# Patient Record
Sex: FEMALE | Race: WHITE | NOT HISPANIC OR LATINO | URBAN - METROPOLITAN AREA
[De-identification: names, ages, dates, MRNs, and addresses within clinical notes are randomized per-mention and may not be internally consistent; named-entity substitution may affect disease eponyms.]

---

## 2017-02-15 ENCOUNTER — COMMUNICATION - HEALTHEAST (OUTPATIENT)
Dept: FAMILY MEDICINE | Facility: CLINIC | Age: 37
End: 2017-02-15

## 2017-02-15 DIAGNOSIS — Z30.9 CONTRACEPTION MANAGEMENT: ICD-10-CM

## 2017-05-16 ENCOUNTER — RECORDS - HEALTHEAST (OUTPATIENT)
Dept: ADMINISTRATIVE | Facility: OTHER | Age: 37
End: 2017-05-16

## 2017-07-18 ENCOUNTER — COMMUNICATION - HEALTHEAST (OUTPATIENT)
Dept: FAMILY MEDICINE | Facility: CLINIC | Age: 37
End: 2017-07-18

## 2017-07-18 DIAGNOSIS — C44.91 BASAL CELL CARCINOMA: ICD-10-CM

## 2017-07-19 ENCOUNTER — COMMUNICATION - HEALTHEAST (OUTPATIENT)
Dept: FAMILY MEDICINE | Facility: CLINIC | Age: 37
End: 2017-07-19

## 2018-01-06 ENCOUNTER — COMMUNICATION - HEALTHEAST (OUTPATIENT)
Dept: FAMILY MEDICINE | Facility: CLINIC | Age: 38
End: 2018-01-06

## 2018-01-09 ENCOUNTER — COMMUNICATION - HEALTHEAST (OUTPATIENT)
Dept: FAMILY MEDICINE | Facility: CLINIC | Age: 38
End: 2018-01-09

## 2018-01-17 ENCOUNTER — OFFICE VISIT - HEALTHEAST (OUTPATIENT)
Dept: FAMILY MEDICINE | Facility: CLINIC | Age: 38
End: 2018-01-17

## 2018-01-17 DIAGNOSIS — Z13.29 SCREENING FOR ENDOCRINE, NUTRITIONAL, METABOLIC AND IMMUNITY DISORDER: ICD-10-CM

## 2018-01-17 DIAGNOSIS — Z13.21 SCREENING FOR ENDOCRINE, NUTRITIONAL, METABOLIC AND IMMUNITY DISORDER: ICD-10-CM

## 2018-01-17 DIAGNOSIS — Z13.0 SCREENING FOR ENDOCRINE, NUTRITIONAL, METABOLIC AND IMMUNITY DISORDER: ICD-10-CM

## 2018-01-17 DIAGNOSIS — Z00.00 VISIT FOR PREVENTIVE HEALTH EXAMINATION: ICD-10-CM

## 2018-01-17 DIAGNOSIS — E66.3 OVERWEIGHT: ICD-10-CM

## 2018-01-17 DIAGNOSIS — Z12.4 PAP SMEAR FOR CERVICAL CANCER SCREENING: ICD-10-CM

## 2018-01-17 DIAGNOSIS — Z13.228 SCREENING FOR ENDOCRINE, NUTRITIONAL, METABOLIC AND IMMUNITY DISORDER: ICD-10-CM

## 2018-01-17 LAB
ALBUMIN SERPL-MCNC: 3.6 G/DL (ref 3.5–5)
ALP SERPL-CCNC: 50 U/L (ref 45–120)
ALT SERPL W P-5'-P-CCNC: 16 U/L (ref 0–45)
ANION GAP SERPL CALCULATED.3IONS-SCNC: 9 MMOL/L (ref 5–18)
AST SERPL W P-5'-P-CCNC: 16 U/L (ref 0–40)
BILIRUB SERPL-MCNC: 0.5 MG/DL (ref 0–1)
BUN SERPL-MCNC: 13 MG/DL (ref 8–22)
CALCIUM SERPL-MCNC: 9.5 MG/DL (ref 8.5–10.5)
CHLORIDE BLD-SCNC: 105 MMOL/L (ref 98–107)
CHOLEST SERPL-MCNC: 179 MG/DL
CO2 SERPL-SCNC: 25 MMOL/L (ref 22–31)
CREAT SERPL-MCNC: 0.75 MG/DL (ref 0.6–1.1)
FASTING STATUS PATIENT QL REPORTED: YES
GFR SERPL CREATININE-BSD FRML MDRD: >60 ML/MIN/1.73M2
GLUCOSE BLD-MCNC: 98 MG/DL (ref 70–125)
HDLC SERPL-MCNC: 60 MG/DL
LDLC SERPL CALC-MCNC: 96 MG/DL
POTASSIUM BLD-SCNC: 4.3 MMOL/L (ref 3.5–5)
PROT SERPL-MCNC: 7.2 G/DL (ref 6–8)
SODIUM SERPL-SCNC: 139 MMOL/L (ref 136–145)
TRIGL SERPL-MCNC: 116 MG/DL
TSH SERPL DL<=0.005 MIU/L-ACNC: 1.64 UIU/ML (ref 0.3–5)

## 2018-01-17 ASSESSMENT — MIFFLIN-ST. JEOR: SCORE: 1383.49

## 2018-01-18 LAB
C TRACH DNA SPEC QL PROBE+SIG AMP: NEGATIVE
HBA1C MFR BLD: 5.2 % (ref 4.2–6.1)
HPV SOURCE: NORMAL
HUMAN PAPILLOMA VIRUS 16 DNA: NEGATIVE
HUMAN PAPILLOMA VIRUS 18 DNA: NEGATIVE
HUMAN PAPILLOMA VIRUS FINAL DIAGNOSIS: NORMAL
HUMAN PAPILLOMA VIRUS OTHER HR: NEGATIVE
N GONORRHOEA DNA SPEC QL NAA+PROBE: NEGATIVE
SPECIMEN DESCRIPTION: NORMAL

## 2018-01-23 LAB

## 2018-03-02 ENCOUNTER — RECORDS - HEALTHEAST (OUTPATIENT)
Dept: ADMINISTRATIVE | Facility: OTHER | Age: 38
End: 2018-03-02

## 2018-04-01 ENCOUNTER — COMMUNICATION - HEALTHEAST (OUTPATIENT)
Dept: FAMILY MEDICINE | Facility: CLINIC | Age: 38
End: 2018-04-01

## 2018-04-01 DIAGNOSIS — Z30.9 CONTRACEPTION MANAGEMENT: ICD-10-CM

## 2018-09-24 ENCOUNTER — OFFICE VISIT - HEALTHEAST (OUTPATIENT)
Dept: FAMILY MEDICINE | Facility: CLINIC | Age: 38
End: 2018-09-24

## 2018-09-24 DIAGNOSIS — M70.61 TROCHANTERIC BURSITIS, RIGHT HIP: ICD-10-CM

## 2018-09-24 ASSESSMENT — MIFFLIN-ST. JEOR: SCORE: 1388.03

## 2018-10-10 ENCOUNTER — COMMUNICATION - HEALTHEAST (OUTPATIENT)
Dept: FAMILY MEDICINE | Facility: CLINIC | Age: 38
End: 2018-10-10

## 2018-11-01 ENCOUNTER — OFFICE VISIT - HEALTHEAST (OUTPATIENT)
Dept: PHYSICAL THERAPY | Facility: REHABILITATION | Age: 38
End: 2018-11-01

## 2018-11-01 DIAGNOSIS — R29.898 WEAKNESS OF RIGHT HIP: ICD-10-CM

## 2018-11-01 DIAGNOSIS — M25.551 RIGHT HIP PAIN: ICD-10-CM

## 2018-11-02 ENCOUNTER — RECORDS - HEALTHEAST (OUTPATIENT)
Dept: ADMINISTRATIVE | Facility: OTHER | Age: 38
End: 2018-11-02

## 2018-11-09 ENCOUNTER — OFFICE VISIT - HEALTHEAST (OUTPATIENT)
Dept: PHYSICAL THERAPY | Facility: REHABILITATION | Age: 38
End: 2018-11-09

## 2018-11-09 DIAGNOSIS — M25.551 RIGHT HIP PAIN: ICD-10-CM

## 2018-11-09 DIAGNOSIS — R29.898 WEAKNESS OF RIGHT HIP: ICD-10-CM

## 2018-11-13 ENCOUNTER — OFFICE VISIT - HEALTHEAST (OUTPATIENT)
Dept: PHYSICAL THERAPY | Facility: REHABILITATION | Age: 38
End: 2018-11-13

## 2018-11-13 DIAGNOSIS — R29.898 WEAKNESS OF RIGHT HIP: ICD-10-CM

## 2018-11-13 DIAGNOSIS — M25.551 RIGHT HIP PAIN: ICD-10-CM

## 2018-11-20 ENCOUNTER — OFFICE VISIT - HEALTHEAST (OUTPATIENT)
Dept: PHYSICAL THERAPY | Facility: REHABILITATION | Age: 38
End: 2018-11-20

## 2018-11-20 DIAGNOSIS — R29.898 WEAKNESS OF RIGHT HIP: ICD-10-CM

## 2018-11-20 DIAGNOSIS — M25.551 RIGHT HIP PAIN: ICD-10-CM

## 2018-11-30 ENCOUNTER — OFFICE VISIT - HEALTHEAST (OUTPATIENT)
Dept: PHYSICAL THERAPY | Facility: REHABILITATION | Age: 38
End: 2018-11-30

## 2018-11-30 DIAGNOSIS — M25.551 RIGHT HIP PAIN: ICD-10-CM

## 2018-11-30 DIAGNOSIS — R29.898 WEAKNESS OF RIGHT HIP: ICD-10-CM

## 2018-12-04 ENCOUNTER — OFFICE VISIT - HEALTHEAST (OUTPATIENT)
Dept: PHYSICAL THERAPY | Facility: REHABILITATION | Age: 38
End: 2018-12-04

## 2018-12-04 DIAGNOSIS — M25.551 RIGHT HIP PAIN: ICD-10-CM

## 2018-12-04 DIAGNOSIS — R29.898 WEAKNESS OF RIGHT HIP: ICD-10-CM

## 2018-12-13 ENCOUNTER — OFFICE VISIT - HEALTHEAST (OUTPATIENT)
Dept: PHYSICAL THERAPY | Facility: REHABILITATION | Age: 38
End: 2018-12-13

## 2018-12-13 DIAGNOSIS — M25.551 RIGHT HIP PAIN: ICD-10-CM

## 2018-12-13 DIAGNOSIS — R29.898 WEAKNESS OF RIGHT HIP: ICD-10-CM

## 2018-12-17 ENCOUNTER — OFFICE VISIT - HEALTHEAST (OUTPATIENT)
Dept: PHYSICAL THERAPY | Facility: REHABILITATION | Age: 38
End: 2018-12-17

## 2018-12-17 DIAGNOSIS — R29.898 WEAKNESS OF RIGHT HIP: ICD-10-CM

## 2018-12-17 DIAGNOSIS — M25.551 RIGHT HIP PAIN: ICD-10-CM

## 2018-12-26 ENCOUNTER — OFFICE VISIT - HEALTHEAST (OUTPATIENT)
Dept: PHYSICAL THERAPY | Facility: REHABILITATION | Age: 38
End: 2018-12-26

## 2018-12-26 DIAGNOSIS — M25.551 RIGHT HIP PAIN: ICD-10-CM

## 2018-12-26 DIAGNOSIS — R29.898 WEAKNESS OF RIGHT HIP: ICD-10-CM

## 2019-01-04 ENCOUNTER — OFFICE VISIT - HEALTHEAST (OUTPATIENT)
Dept: PHYSICAL THERAPY | Facility: REHABILITATION | Age: 39
End: 2019-01-04

## 2019-01-04 DIAGNOSIS — R29.898 WEAKNESS OF RIGHT HIP: ICD-10-CM

## 2019-01-04 DIAGNOSIS — M25.551 RIGHT HIP PAIN: ICD-10-CM

## 2019-01-11 ENCOUNTER — OFFICE VISIT - HEALTHEAST (OUTPATIENT)
Dept: PHYSICAL THERAPY | Facility: REHABILITATION | Age: 39
End: 2019-01-11

## 2019-01-11 DIAGNOSIS — M25.551 RIGHT HIP PAIN: ICD-10-CM

## 2019-01-11 DIAGNOSIS — R29.898 WEAKNESS OF RIGHT HIP: ICD-10-CM

## 2019-01-21 ENCOUNTER — OFFICE VISIT - HEALTHEAST (OUTPATIENT)
Dept: PHYSICAL THERAPY | Facility: REHABILITATION | Age: 39
End: 2019-01-21

## 2019-01-21 DIAGNOSIS — R29.898 WEAKNESS OF RIGHT HIP: ICD-10-CM

## 2019-01-21 DIAGNOSIS — M25.551 RIGHT HIP PAIN: ICD-10-CM

## 2019-01-25 ENCOUNTER — COMMUNICATION - HEALTHEAST (OUTPATIENT)
Dept: FAMILY MEDICINE | Facility: CLINIC | Age: 39
End: 2019-01-25

## 2019-01-25 DIAGNOSIS — F41.9 ANXIETY: ICD-10-CM

## 2019-01-29 ENCOUNTER — OFFICE VISIT - HEALTHEAST (OUTPATIENT)
Dept: PHYSICAL THERAPY | Facility: REHABILITATION | Age: 39
End: 2019-01-29

## 2019-01-29 DIAGNOSIS — R29.898 WEAKNESS OF RIGHT HIP: ICD-10-CM

## 2019-01-29 DIAGNOSIS — M25.551 RIGHT HIP PAIN: ICD-10-CM

## 2019-02-07 ENCOUNTER — COMMUNICATION - HEALTHEAST (OUTPATIENT)
Dept: FAMILY MEDICINE | Facility: CLINIC | Age: 39
End: 2019-02-07

## 2019-02-26 ENCOUNTER — OFFICE VISIT - HEALTHEAST (OUTPATIENT)
Dept: FAMILY MEDICINE | Facility: CLINIC | Age: 39
End: 2019-02-26

## 2019-02-26 DIAGNOSIS — Z30.9 CONTRACEPTION MANAGEMENT: ICD-10-CM

## 2019-02-26 DIAGNOSIS — F41.9 ANXIETY: ICD-10-CM

## 2019-02-26 DIAGNOSIS — H65.93 BILATERAL NON-SUPPURATIVE OTITIS MEDIA: ICD-10-CM

## 2019-05-03 ENCOUNTER — RECORDS - HEALTHEAST (OUTPATIENT)
Dept: ADMINISTRATIVE | Facility: OTHER | Age: 39
End: 2019-05-03

## 2019-12-20 ENCOUNTER — RECORDS - HEALTHEAST (OUTPATIENT)
Dept: ADMINISTRATIVE | Facility: OTHER | Age: 39
End: 2019-12-20

## 2020-03-03 ENCOUNTER — OFFICE VISIT - HEALTHEAST (OUTPATIENT)
Dept: FAMILY MEDICINE | Facility: CLINIC | Age: 40
End: 2020-03-03

## 2020-03-03 DIAGNOSIS — L72.9 INFECTED CYST OF SKIN: ICD-10-CM

## 2020-03-03 DIAGNOSIS — Z30.9 CONTRACEPTION MANAGEMENT: ICD-10-CM

## 2020-03-03 DIAGNOSIS — Z00.00 VISIT FOR PREVENTIVE HEALTH EXAMINATION: ICD-10-CM

## 2020-03-03 DIAGNOSIS — L08.9 INFECTED CYST OF SKIN: ICD-10-CM

## 2020-03-03 DIAGNOSIS — E66.3 OVERWEIGHT (BMI 25.0-29.9): ICD-10-CM

## 2020-03-03 DIAGNOSIS — R19.7 DIARRHEA, UNSPECIFIED TYPE: ICD-10-CM

## 2020-03-03 DIAGNOSIS — F41.9 ANXIETY: ICD-10-CM

## 2020-03-03 DIAGNOSIS — Z11.4 SCREENING FOR HIV WITHOUT PRESENCE OF RISK FACTORS: ICD-10-CM

## 2020-03-03 LAB
ALBUMIN SERPL-MCNC: 3.7 G/DL (ref 3.5–5)
ALP SERPL-CCNC: 54 U/L (ref 45–120)
ALT SERPL W P-5'-P-CCNC: 14 U/L (ref 0–45)
ANION GAP SERPL CALCULATED.3IONS-SCNC: 10 MMOL/L (ref 5–18)
AST SERPL W P-5'-P-CCNC: 15 U/L (ref 0–40)
BILIRUB SERPL-MCNC: 0.3 MG/DL (ref 0–1)
BUN SERPL-MCNC: 8 MG/DL (ref 8–22)
CALCIUM SERPL-MCNC: 9.6 MG/DL (ref 8.5–10.5)
CHLORIDE BLD-SCNC: 101 MMOL/L (ref 98–107)
CHOLEST SERPL-MCNC: 165 MG/DL
CO2 SERPL-SCNC: 24 MMOL/L (ref 22–31)
CREAT SERPL-MCNC: 0.79 MG/DL (ref 0.6–1.1)
ERYTHROCYTE [DISTWIDTH] IN BLOOD BY AUTOMATED COUNT: 11.1 % (ref 11–14.5)
FASTING STATUS PATIENT QL REPORTED: NO
GFR SERPL CREATININE-BSD FRML MDRD: >60 ML/MIN/1.73M2
GLUCOSE BLD-MCNC: 161 MG/DL (ref 70–125)
HBA1C MFR BLD: 4.9 % (ref 3.5–6)
HCT VFR BLD AUTO: 42.4 % (ref 35–47)
HDLC SERPL-MCNC: 61 MG/DL
HGB BLD-MCNC: 14.2 G/DL (ref 12–16)
HIV 1+2 AB+HIV1 P24 AG SERPL QL IA: NEGATIVE
LDLC SERPL CALC-MCNC: 85 MG/DL
MCH RBC QN AUTO: 30.5 PG (ref 27–34)
MCHC RBC AUTO-ENTMCNC: 33.6 G/DL (ref 32–36)
MCV RBC AUTO: 91 FL (ref 80–100)
PLATELET # BLD AUTO: 294 THOU/UL (ref 140–440)
PMV BLD AUTO: 7.4 FL (ref 7–10)
POTASSIUM BLD-SCNC: 3.7 MMOL/L (ref 3.5–5)
PROT SERPL-MCNC: 6.9 G/DL (ref 6–8)
RBC # BLD AUTO: 4.66 MILL/UL (ref 3.8–5.4)
SODIUM SERPL-SCNC: 135 MMOL/L (ref 136–145)
TRIGL SERPL-MCNC: 95 MG/DL
TSH SERPL DL<=0.005 MIU/L-ACNC: 1.3 UIU/ML (ref 0.3–5)
WBC: 6.5 THOU/UL (ref 4–11)

## 2020-03-03 ASSESSMENT — ANXIETY QUESTIONNAIRES
2. NOT BEING ABLE TO STOP OR CONTROL WORRYING: SEVERAL DAYS
1. FEELING NERVOUS, ANXIOUS, OR ON EDGE: MORE THAN HALF THE DAYS
4. TROUBLE RELAXING: SEVERAL DAYS
6. BECOMING EASILY ANNOYED OR IRRITABLE: SEVERAL DAYS
7. FEELING AFRAID AS IF SOMETHING AWFUL MIGHT HAPPEN: MORE THAN HALF THE DAYS
IF YOU CHECKED OFF ANY PROBLEMS ON THIS QUESTIONNAIRE, HOW DIFFICULT HAVE THESE PROBLEMS MADE IT FOR YOU TO DO YOUR WORK, TAKE CARE OF THINGS AT HOME, OR GET ALONG WITH OTHER PEOPLE: VERY DIFFICULT
GAD7 TOTAL SCORE: 9
3. WORRYING TOO MUCH ABOUT DIFFERENT THINGS: MORE THAN HALF THE DAYS
5. BEING SO RESTLESS THAT IT IS HARD TO SIT STILL: NOT AT ALL

## 2020-03-03 ASSESSMENT — MIFFLIN-ST. JEOR: SCORE: 1421.47

## 2020-03-04 ENCOUNTER — AMBULATORY - HEALTHEAST (OUTPATIENT)
Dept: LAB | Facility: CLINIC | Age: 40
End: 2020-03-04

## 2020-03-04 DIAGNOSIS — R19.7 DIARRHEA, UNSPECIFIED TYPE: ICD-10-CM

## 2020-03-04 LAB
C DIFF TOX B STL QL: NEGATIVE
RIBOTYPE 027/NAP1/BI: NORMAL

## 2020-03-05 ENCOUNTER — OFFICE VISIT - HEALTHEAST (OUTPATIENT)
Dept: FAMILY MEDICINE | Facility: CLINIC | Age: 40
End: 2020-03-05

## 2020-03-05 DIAGNOSIS — A09 TRAVELER'S DIARRHEA: ICD-10-CM

## 2020-03-05 DIAGNOSIS — Z51.89 VISIT FOR WOUND CHECK: ICD-10-CM

## 2020-03-05 LAB
SHIGA TOXIN 1: NEGATIVE
SHIGA TOXIN 2: NEGATIVE

## 2020-03-07 LAB — BACTERIA SPEC CULT: NORMAL

## 2020-04-20 ENCOUNTER — COMMUNICATION - HEALTHEAST (OUTPATIENT)
Dept: FAMILY MEDICINE | Facility: CLINIC | Age: 40
End: 2020-04-20

## 2020-04-20 DIAGNOSIS — Z30.9 CONTRACEPTION MANAGEMENT: ICD-10-CM

## 2020-04-21 ENCOUNTER — RECORDS - HEALTHEAST (OUTPATIENT)
Dept: ADMINISTRATIVE | Facility: OTHER | Age: 40
End: 2020-04-21

## 2020-05-13 ENCOUNTER — RECORDS - HEALTHEAST (OUTPATIENT)
Dept: ADMINISTRATIVE | Facility: OTHER | Age: 40
End: 2020-05-13

## 2020-06-23 ENCOUNTER — RECORDS - HEALTHEAST (OUTPATIENT)
Dept: ADMINISTRATIVE | Facility: OTHER | Age: 40
End: 2020-06-23

## 2020-08-18 ENCOUNTER — OFFICE VISIT - HEALTHEAST (OUTPATIENT)
Dept: FAMILY MEDICINE | Facility: CLINIC | Age: 40
End: 2020-08-18

## 2020-08-18 DIAGNOSIS — R03.0 ELEVATED BLOOD PRESSURE READING WITHOUT DIAGNOSIS OF HYPERTENSION: ICD-10-CM

## 2020-09-29 ENCOUNTER — RECORDS - HEALTHEAST (OUTPATIENT)
Dept: ADMINISTRATIVE | Facility: OTHER | Age: 40
End: 2020-09-29

## 2020-09-29 ENCOUNTER — COMMUNICATION - HEALTHEAST (OUTPATIENT)
Dept: FAMILY MEDICINE | Facility: CLINIC | Age: 40
End: 2020-09-29

## 2020-09-29 DIAGNOSIS — F41.9 ANXIETY: ICD-10-CM

## 2021-02-01 ENCOUNTER — RECORDS - HEALTHEAST (OUTPATIENT)
Dept: ADMINISTRATIVE | Facility: OTHER | Age: 41
End: 2021-02-01

## 2021-04-02 ENCOUNTER — COMMUNICATION - HEALTHEAST (OUTPATIENT)
Dept: FAMILY MEDICINE | Facility: CLINIC | Age: 41
End: 2021-04-02

## 2021-04-02 DIAGNOSIS — F41.9 ANXIETY: ICD-10-CM

## 2021-04-04 RX ORDER — CITALOPRAM HYDROBROMIDE 20 MG/1
TABLET ORAL
Qty: 135 TABLET | Refills: 1 | Status: SHIPPED | OUTPATIENT
Start: 2021-04-04 | End: 2021-10-19

## 2021-04-14 ENCOUNTER — OFFICE VISIT - HEALTHEAST (OUTPATIENT)
Dept: FAMILY MEDICINE | Facility: CLINIC | Age: 41
End: 2021-04-14

## 2021-04-14 DIAGNOSIS — Z13.29 SCREENING FOR ENDOCRINE, NUTRITIONAL, METABOLIC AND IMMUNITY DISORDER: ICD-10-CM

## 2021-04-14 DIAGNOSIS — Z13.0 SCREENING FOR ENDOCRINE, NUTRITIONAL, METABOLIC AND IMMUNITY DISORDER: ICD-10-CM

## 2021-04-14 DIAGNOSIS — Z13.21 SCREENING FOR ENDOCRINE, NUTRITIONAL, METABOLIC AND IMMUNITY DISORDER: ICD-10-CM

## 2021-04-14 DIAGNOSIS — F41.1 ANXIETY STATE: ICD-10-CM

## 2021-04-14 DIAGNOSIS — Z11.59 ENCOUNTER FOR HCV SCREENING TEST FOR LOW RISK PATIENT: ICD-10-CM

## 2021-04-14 DIAGNOSIS — Z13.228 SCREENING FOR ENDOCRINE, NUTRITIONAL, METABOLIC AND IMMUNITY DISORDER: ICD-10-CM

## 2021-04-14 DIAGNOSIS — Z00.00 VISIT FOR PREVENTIVE HEALTH EXAMINATION: ICD-10-CM

## 2021-04-14 LAB
ALBUMIN SERPL-MCNC: 4.5 G/DL (ref 3.5–5)
ALP SERPL-CCNC: 69 U/L (ref 45–120)
ALT SERPL W P-5'-P-CCNC: 23 U/L (ref 0–45)
ANION GAP SERPL CALCULATED.3IONS-SCNC: 13 MMOL/L (ref 5–18)
AST SERPL W P-5'-P-CCNC: 22 U/L (ref 0–40)
BILIRUB SERPL-MCNC: 0.4 MG/DL (ref 0–1)
BUN SERPL-MCNC: 11 MG/DL (ref 8–22)
CALCIUM SERPL-MCNC: 9.7 MG/DL (ref 8.5–10.5)
CHLORIDE BLD-SCNC: 102 MMOL/L (ref 98–107)
CO2 SERPL-SCNC: 22 MMOL/L (ref 22–31)
CREAT SERPL-MCNC: 0.8 MG/DL (ref 0.6–1.1)
ERYTHROCYTE [DISTWIDTH] IN BLOOD BY AUTOMATED COUNT: 12.4 % (ref 11–14.5)
GFR SERPL CREATININE-BSD FRML MDRD: >60 ML/MIN/1.73M2
GLUCOSE BLD-MCNC: 90 MG/DL (ref 70–125)
HCT VFR BLD AUTO: 44.5 % (ref 35–47)
HGB BLD-MCNC: 15 G/DL (ref 12–16)
MCH RBC QN AUTO: 29.4 PG (ref 27–34)
MCHC RBC AUTO-ENTMCNC: 33.7 G/DL (ref 32–36)
MCV RBC AUTO: 87 FL (ref 80–100)
PLATELET # BLD AUTO: 363 THOU/UL (ref 140–440)
PMV BLD AUTO: 9.2 FL (ref 7–10)
POTASSIUM BLD-SCNC: 4.1 MMOL/L (ref 3.5–5)
PROT SERPL-MCNC: 7.5 G/DL (ref 6–8)
RBC # BLD AUTO: 5.11 MILL/UL (ref 3.8–5.4)
SODIUM SERPL-SCNC: 137 MMOL/L (ref 136–145)
TSH SERPL DL<=0.005 MIU/L-ACNC: 2.49 UIU/ML (ref 0.3–5)
WBC: 7 THOU/UL (ref 4–11)

## 2021-04-14 ASSESSMENT — ANXIETY QUESTIONNAIRES
7. FEELING AFRAID AS IF SOMETHING AWFUL MIGHT HAPPEN: SEVERAL DAYS
IF YOU CHECKED OFF ANY PROBLEMS ON THIS QUESTIONNAIRE, HOW DIFFICULT HAVE THESE PROBLEMS MADE IT FOR YOU TO DO YOUR WORK, TAKE CARE OF THINGS AT HOME, OR GET ALONG WITH OTHER PEOPLE: VERY DIFFICULT
2. NOT BEING ABLE TO STOP OR CONTROL WORRYING: SEVERAL DAYS
5. BEING SO RESTLESS THAT IT IS HARD TO SIT STILL: SEVERAL DAYS
4. TROUBLE RELAXING: SEVERAL DAYS
GAD7 TOTAL SCORE: 8
6. BECOMING EASILY ANNOYED OR IRRITABLE: SEVERAL DAYS
1. FEELING NERVOUS, ANXIOUS, OR ON EDGE: SEVERAL DAYS
3. WORRYING TOO MUCH ABOUT DIFFERENT THINGS: MORE THAN HALF THE DAYS

## 2021-04-14 ASSESSMENT — MIFFLIN-ST. JEOR: SCORE: 1464.56

## 2021-04-14 ASSESSMENT — PATIENT HEALTH QUESTIONNAIRE - PHQ9: SUM OF ALL RESPONSES TO PHQ QUESTIONS 1-9: 4

## 2021-04-15 LAB — HCV AB SERPL QL IA: NEGATIVE

## 2021-05-26 ENCOUNTER — OFFICE VISIT - HEALTHEAST (OUTPATIENT)
Dept: FAMILY MEDICINE | Facility: CLINIC | Age: 41
End: 2021-05-26

## 2021-05-26 DIAGNOSIS — F41.1 ANXIETY STATE: ICD-10-CM

## 2021-05-26 RX ORDER — BUSPIRONE HYDROCHLORIDE 10 MG/1
10 TABLET ORAL 2 TIMES DAILY
Qty: 60 TABLET | Refills: 5 | Status: SHIPPED | OUTPATIENT
Start: 2021-05-26 | End: 2021-08-18

## 2021-05-27 VITALS — OXYGEN SATURATION: 99 % | DIASTOLIC BLOOD PRESSURE: 81 MMHG | HEART RATE: 95 BPM | SYSTOLIC BLOOD PRESSURE: 145 MMHG

## 2021-05-27 ASSESSMENT — PATIENT HEALTH QUESTIONNAIRE - PHQ9: SUM OF ALL RESPONSES TO PHQ QUESTIONS 1-9: 4

## 2021-05-28 ASSESSMENT — ANXIETY QUESTIONNAIRES
GAD7 TOTAL SCORE: 8
GAD7 TOTAL SCORE: 9

## 2021-05-30 ENCOUNTER — HEALTH MAINTENANCE LETTER (OUTPATIENT)
Age: 41
End: 2021-05-30

## 2021-05-31 VITALS — HEIGHT: 63 IN | WEIGHT: 163 LBS | BODY MASS INDEX: 28.88 KG/M2

## 2021-06-02 VITALS — BODY MASS INDEX: 29.06 KG/M2 | WEIGHT: 164 LBS | HEIGHT: 63 IN

## 2021-06-02 VITALS — BODY MASS INDEX: 28.7 KG/M2 | WEIGHT: 162 LBS

## 2021-06-04 VITALS
OXYGEN SATURATION: 98 % | DIASTOLIC BLOOD PRESSURE: 91 MMHG | BODY MASS INDEX: 30.21 KG/M2 | HEART RATE: 97 BPM | SYSTOLIC BLOOD PRESSURE: 138 MMHG | HEIGHT: 63 IN | WEIGHT: 170.5 LBS

## 2021-06-05 VITALS
BODY MASS INDEX: 31.89 KG/M2 | HEIGHT: 63 IN | HEART RATE: 72 BPM | SYSTOLIC BLOOD PRESSURE: 120 MMHG | DIASTOLIC BLOOD PRESSURE: 80 MMHG | WEIGHT: 180 LBS

## 2021-06-06 NOTE — PROGRESS NOTES
OFFICE VISIT - FAMILY MEDICINE     ASSESSMENT AND PLAN     1. Visit for wound check     2. Traveler's diarrhea  metroNIDAZOLE (FLAGYL) 500 MG tablet   Diarrhea, symptoms started after coming back from [place], stool culture still pending, empiric treatment with metronidazole.  Symptomatic care discussed.  Wound was checked today, dressing was changed, wound care instruction given to patient, follow-up if any sign of infection.    CHIEF COMPLAINT   Wound Check (on back, patient states is tender, a little bit of drainage.) and Diarrhea (still having diarrhea-liquid,mucus. Still having the same abdominal pain, patient states feels like a rock in middle of stomach.)    HPI   Renee Sebastian is a 39 y.o. female.  Updated MIIC - Needs TDAP    Incision and drainage of an abscess about 2 days ago, she is here for recheck, overall has been doing fine, no fever or chills.  Still having diarrhea, initial stool test result has been negative, denies any fever chills, she did travel to [place] prior to onset of symptoms couple weeks ago.      Review of Systems As per HPI, otherwise negative.    OBJECTIVE   /81 (Patient Site: Right Arm, Patient Position: Sitting, Cuff Size: Adult Regular)   Pulse 95   SpO2 99%   Physical Exam   Constitutional: She is oriented to person, place, and time. She appears well-developed and well-nourished.   HENT:   Head: Normocephalic and atraumatic.   Cardiovascular: Normal rate, regular rhythm, normal heart sounds and intact distal pulses. Exam reveals no gallop and no friction rub.   No murmur heard.  Pulmonary/Chest: Effort normal and breath sounds normal. No respiratory distress. She has no wheezes. She has no rales.   Musculoskeletal:         General: No tenderness or edema.   Neurological: She is alert and oriented to person, place, and time. Coordination normal.   Skin:   Packing removed from wound in the right mid lower back area, no sign of infection, area was cleaned with Betadine,  topical antibiotic applied followed by bandages, wound care instruction given to patient.   Psychiatric: She has a normal mood and affect. Judgment and thought content normal.       PFS     Family History   Problem Relation Age of Onset     Cancer Father      Hearing loss Father      Hypertension Father      Heart disease Father      Social History     Socioeconomic History     Marital status: Single     Spouse name: Not on file     Number of children: Not on file     Years of education: Not on file     Highest education level: Not on file   Occupational History     Not on file   Social Needs     Financial resource strain: Not on file     Food insecurity:     Worry: Not on file     Inability: Not on file     Transportation needs:     Medical: Not on file     Non-medical: Not on file   Tobacco Use     Smoking status: Never Smoker     Smokeless tobacco: Never Used   Substance and Sexual Activity     Alcohol use: Not on file     Drug use: Not on file     Sexual activity: Not on file   Lifestyle     Physical activity:     Days per week: Not on file     Minutes per session: Not on file     Stress: Not on file   Relationships     Social connections:     Talks on phone: Not on file     Gets together: Not on file     Attends [employer]ism service: Not on file     Active member of club or organization: Not on file     Attends meetings of clubs or organizations: Not on file     Relationship status: Not on file     Intimate partner violence:     Fear of current or ex partner: Not on file     Emotionally abused: Not on file     Physically abused: Not on file     Forced sexual activity: Not on file   Other Topics Concern     Not on file   Social History Narrative     Not on file     Relevant history was reviewed with the patient today, unless noted in HPI, nothing is pertinent for this visit.  [facility]     Patient Active Problem List    Diagnosis Date Noted     Anxiety associated with depression 12/01/2015     Vitamin D deficiency  disease 05/12/2015     Basal cell carcinoma of nose 05/07/2015     Acne      Overview Note:     Created by Conversion         Anxiety      Overview Note:     Created by Conversion    Replacement Utility updated for latest IMO load       No past surgical history on file.    RESULTS/CONSULTS (Lab/Rad)     Recent Results (from the past 168 hour(s))   Glycosylated Hemoglobin A1c   Result Value Ref Range    Hemoglobin A1c 4.9 3.5 - 6.0 %   Comprehensive Metabolic Panel   Result Value Ref Range    Sodium 135 (L) 136 - 145 mmol/L    Potassium 3.7 3.5 - 5.0 mmol/L    Chloride 101 98 - 107 mmol/L    CO2 24 22 - 31 mmol/L    Anion Gap, Calculation 10 5 - 18 mmol/L    Glucose 161 (H) 70 - 125 mg/dL    BUN 8 8 - 22 mg/dL    Creatinine 0.79 0.60 - 1.10 mg/dL    GFR MDRD Af Amer >60 >60 mL/min/1.73m2    GFR MDRD Non Af Amer >60 >60 mL/min/1.73m2    Bilirubin, Total 0.3 0.0 - 1.0 mg/dL    Calcium 9.6 8.5 - 10.5 mg/dL    Protein, Total 6.9 6.0 - 8.0 g/dL    Albumin 3.7 3.5 - 5.0 g/dL    Alkaline Phosphatase 54 45 - 120 U/L    AST 15 0 - 40 U/L    ALT 14 0 - 45 U/L   HM2(CBC w/o Differential)   Result Value Ref Range    WBC 6.5 4.0 - 11.0 thou/uL    RBC 4.66 3.80 - 5.40 mill/uL    Hemoglobin 14.2 12.0 - 16.0 g/dL    Hematocrit 42.4 35.0 - 47.0 %    MCV 91 80 - 100 fL    MCH 30.5 27.0 - 34.0 pg    MCHC 33.6 32.0 - 36.0 g/dL    RDW 11.1 11.0 - 14.5 %    Platelets 294 140 - 440 thou/uL    MPV 7.4 7.0 - 10.0 fL   Thyroid Cascade   Result Value Ref Range    TSH 1.30 0.30 - 5.00 uIU/mL   Lipid Cascade   Result Value Ref Range    Cholesterol 165 <=199 mg/dL    Triglycerides 95 <=149 mg/dL    HDL Cholesterol 61 >=50 mg/dL    LDL Calculated 85 <=129 mg/dL    Patient Fasting > 8hrs? No    HIV Antigen/Antibody Screening Cascade   Result Value Ref Range    HIV Antigen / Antibody Negative Negative   C. difficile Toxigenic by PCR   Result Value Ref Range    C.Difficile Toxigenic by PCR Negative Negative    Ribotype 027/NAP1/B1 Presumptive  Negative Presumptive Negative   EnteroHaemorrhagic E. coli Work Up   Result Value Ref Range    Shiga Toxin 1 Negative Negative    Shiga Toxin 2 Negative Negative     No results found.  MEDICATIONS     Current Outpatient Medications on File Prior to Visit   Medication Sig Dispense Refill     ADAPALENE (DIFFERIN TOP) Apply topically.       citalopram (CELEXA) 20 MG tablet Take 1.5 tablets (30 mg total) by mouth daily. 135 tablet 1     etonogestreL-ethinyl estradioL (NUVARING) 0.12-0.015 mg/24 hr vaginal ring INSERT 1 RING VAGINALLY AND LEAVE IN PLACE FOR 3 WEEKS THEN REMOVE FOR 1 WEEK 6 each 8     spironolactone (ALDACTONE) 50 MG tablet        No current facility-administered medications on file prior to visit.        HEALTH MAINTENANCE / SCREENING   No data recorded, No data recorded,DOLORES 7 Total Score: 9 (3/3/2020  4:00 PM)    Immunization History   Administered Date(s) Administered     DTaP, historic 07/21/1983     Dtap 07/21/1983     HPV Quadrivalent 03/20/2007     Hep A, Adult IM (19yr & older) 03/07/2006, 03/20/2007     Hep A, historic 03/07/2006, 03/20/2007     Hep B, Adult 01/18/2005, 02/23/2005, 03/07/2006     Hep B, historic 01/18/2005, 02/23/2005, 03/07/2006     IPV 07/21/1983     Influenza, Seasonal, Inj PF IIV3 11/19/2010, 09/21/2012     Influenza, inj, historic,unspecified 11/19/2010, 09/21/2012, 12/04/2014, 10/29/2015     Influenza,seasonal quad, PF, =/> 6months 12/04/2014, 10/29/2015, 11/18/2016, 10/03/2017, 12/05/2018     Influenza,seasonal,quad inj =/> 6months 09/12/2019     Meningococcal MCV4P 03/20/2007     Meningococcal MPSV4, SQ 03/20/2007     Tdap 02/26/2019     Health Maintenance   Topic     PREVENTIVE CARE VISIT      PAP SMEAR      HPV TEST      ADVANCE CARE PLANNING      TD 18+ HE      DEPRESSION ACTION PLAN      HIV SCREENING      INFLUENZA VACCINE RULE BASED      TDAP ADULT ONE TIME DOSE        Miryam Corbin MD  Family Medicine, [facility]     This note was  dictated using a voice recognition software.  Any grammatical or context distortion are unintentional and inherent to the software.

## 2021-06-06 NOTE — PROGRESS NOTES
FEMALE ADULT PREVENTIVE EXAM    CHIEF COMPLAINT:  Female preventive exam.    SUBJECTIVE:  Renee Sebastian is a 39 y.o. female who presents for her routine physical exam.    Patient would like to address the following concerns today: Infected cyst in the back, painful, has been there for the last few weeks getting bigger.  Anxiety seems to be get worse with lots of rumination, stomach symptoms associated with diarrhea.      GYNE HISTORY  Menses: yes  Sexually Active: na  Contraception: na  Last Pap: 2017  Abnormal Pap: no  Vaginal Discharge: no      She  has no past medical history on file.    Lab Results   Component Value Date    WBC 6.5 03/03/2020    HGB 14.2 03/03/2020    HCT 42.4 03/03/2020    MCV 91 03/03/2020     03/03/2020     (L) 03/03/2020    K 3.7 03/03/2020    BUN 8 03/03/2020     Lab Results   Component Value Date    CHOL 165 03/03/2020    HDL 61 03/03/2020    LDLCALC 85 03/03/2020    TRIG 95 03/03/2020     Lab Results   Component Value Date    TSH 1.30 03/03/2020     BP Readings from Last 3 Encounters:   03/03/20 (!) 138/91   02/26/19 140/79   09/24/18 138/88       Surgeries:  No past surgical history on file.    Family History:  Her family history includes Cancer in her father; Hearing loss in her father; Heart disease in her father; Hypertension in her father.    Social History:  She  reports that she has never smoked. She has never used smokeless tobacco.    Medications:    Current Outpatient Medications:      ADAPALENE (DIFFERIN TOP), Apply topically., Disp: , Rfl:      citalopram (CELEXA) 20 MG tablet, Take 1.5 tablets (30 mg total) by mouth daily., Disp: 135 tablet, Rfl: 1     etonogestreL-ethinyl estradioL (NUVARING) 0.12-0.015 mg/24 hr vaginal ring, INSERT 1 RING VAGINALLY AND LEAVE IN PLACE FOR 3 WEEKS THEN REMOVE FOR 1 WEEK, Disp: 6 each, Rfl: 8     spironolactone (ALDACTONE) 50 MG tablet, , Disp: , Rfl:   HELD MEDICATIONS: None.    Allergies:  No latex allergies.  No Known  Allergies         RISK BEHAVIOR & HEALTH HABITS  Self Breast Exam: yes.  Regular Exercise: yes.  Balanced diet: yes.  Seat Belt Use: yes  Calcium intake/Osteoporosis prevention: yes.    Guns: NA  Sun Screen: YES  Dental Care: YES    REVIEW OF SYSTEMS:  Complete head to toe review of systems is otherwise negative except as above.    OBJECTIVE:  VITAL SIGNS:    Vitals:    03/03/20 1426   BP: (!) 138/91   Pulse:    SpO2:      GENERAL:  Patient alert, in no acute distress.  EYES: PERRLA. Extraoccular movements intact, pupils equal, reactive to light and accommodation.  Normal conjunctiva and lids.    ENT:  Hearing grossly normal.  Normal appearance to ears and nose.  Bilateral TM s, external canals, oropharynx normal. Normal lips, gums and teeth.    NECK:  Supple, without thyromegaly or mass, no adenopathies.  RESP:  Clear to auscultation without crackles, wheezes or distress.  Normal respiratory effort.   CV:  Regular rate and rhythm without murmurs, rubs or gallops.  Normal pedal pulses.  No varicosities or edema.  ABDOMEN:  Soft, non-tender, without hepatosplenomegaly, masses, or hernias.   BREASTS:  Deferred  :  DeferredRectal:  Deferred  LYMPHATIC: Normal palpation of neck.  No lymphadenopathy.  No bruising.  NEURO:  CN II-XII intact, motor & sensory function all intact.  DTR and reflexes normal.  PSYCHIATRIC:  Alert & oriented with normal mood and affect.  Good judgment and insight.  SKIN: 2 x 3 cm mildly tender with fluctuation inflamed sebaceous cyst right lower back area.  Advised normal inspection and palpation.  MUSCULOSKELETAL: Normal gait and station.  - Spine / Ribs / Pelvis: Normal inspection, ROM, stability and strength: Spine, Head, Neck, Upper and Lower Extremities.    ASSESSMENT & PLAN  Renee was seen today for annual exam, cyst and diarrhea.    Diagnoses and all orders for this visit:    Visit for preventive health examination    Anxiety  -     citalopram (CELEXA) 20 MG tablet; Take 1.5 tablets (30  mg total) by mouth daily.    Contraception management  -     etonogestreL-ethinyl estradioL (NUVARING) 0.12-0.015 mg/24 hr vaginal ring; INSERT 1 RING VAGINALLY AND LEAVE IN PLACE FOR 3 WEEKS THEN REMOVE FOR 1 WEEK    Overweight (BMI 25.0-29.9)  -     Glycosylated Hemoglobin A1c  -     Comprehensive Metabolic Panel  -     Thyroid Cascade  -     Lipid Cascade    Diarrhea, unspecified type  -     C. difficile Toxigenic by PCR; Future  -     HM2(CBC w/o Differential)  -     Culture, Stool; Future    Screening for HIV without presence of risk factors  -     HIV Antigen/Antibody Screening Cascade    Infected cyst of skin  -     Incision and drainage 2 x 3 cm cyst right back area.    Incision and drainage right sebaceous cyst, patient tolerated well, wound was packed, return in 2 days for reassessment.  Increase citalopram to 30 mg daily, resection about 4 to 6 weeks.  General  Immunizations reviewed and updated .  Alcohol use, safety and moderation discussed.  Recommended adequate calcium, vitamin D intake/osteoporosis prevention.  Discussed colon cancer screening at age 50, 45 if -American.  Diet and exercise reviewed, including goal of aerobic exercise 30-90 minutes most days of the week, moderation of portions sizes, avoiding eating out and fast food and increase in fruits and vegetables.  Discussed safe sex practices.    Discussed & recommended seat belt (& motorcycle helmet) use.  Discussed & recommended smoke detector.  Discussed sun protection.  Discussed weight management.  Discussed self breast exam, screening mammogram timing.  The following high BMI interventions were performed this visit: encouragement to exercise and weight loss from baseline weight    Miryam Corbin MD

## 2021-06-06 NOTE — PROGRESS NOTES
Incision and drainage 2 x 3 cm cyst right back area.  Date/Time: 3/4/2020 11:23 AM  Performed by: Miryam Corbin MD  Authorized by: Miryam Corbin MD       Universal Protocol    Site marked: Yes    Prior images obtained and reviewed: NA    Required items: required blood products, implants, devices, and special equipment available    Patient identity confirmed: verbally with patient    Reevaluation: NA - No sedation, light sedation, or local anesthesia    Confirmation checklist: patient's identity using two indicators    Time out: Immediately prior to procedure a time out was called to verify the correct patient, procedure, equipment, support staff and site/side marked as required    Universal Protocol: Joint Commission Universal Protocol was followed    Preparation: Patient was prepped and draped in the usual sterile fashion    Indications/Location  Type: abscess  Body area: trunk  Location: back    Anesthesia    Local anesthesia used?: Yes    Anesthesia: local infiltration    Local anesthetic: lidocaine 1% with epinephrine    Sedation  Patient sedation: No    Procedure Details  Scalpel size: 11  Incision type: single straight  Drainage: purulent  Drainage amount: moderate  Wound treatment: wound left open  Packing material: 1/2 in iodoform gauze      Post-procedure   Length of time physician present for 1:1 monitoring during sedation: 0

## 2021-06-10 NOTE — PROGRESS NOTES
"Renee Sebastian is a 39 y.o. female who is being evaluated via a billable video visit.      The patient has been notified of following:     \"This video visit will be conducted via a call between you and your physician/provider. We have found that certain health care needs can be provided without the need for an in-person physical exam.  This service lets us provide the care you need with a video conversation.  If a prescription is necessary we can send it directly to your pharmacy.  If lab work is needed we can place an order for that and you can then stop by our lab to have the test done at a later time.    Video visits are billed at different rates depending on your insurance coverage. Please reach out to your insurance provider with any questions.    If during the course of the call the physician/provider feels a video visit is not appropriate, you will not be charged for this service.\"    Patient has given verbal consent to a Video visit? Yes  How would you like to obtain your AVS? AVS Preference: MyChart.  If dropped by the video visit, the video invitation should be sent to: Send to e-mail at: [email or web address]  Will anyone else be joining your video visit? No        Video Start Time: 03:05 pm    Additional provider notes: Blood pressure mildly elevated for the past few weeks, was around 165/100 at the dentist office a few days ago, denies any associated dizziness, chest pain or shortness of breath, occasionally has had some mild headaches.  Mom has a history of hypertension.  Not sure about that.  Previous clinic blood pressure reviewed, has been mildly depressed several months.  Current medications include spironolactone for acne, citalopram for depression and a NuvaRing.     GENERAL: Healthy, alert and no distress  EYES: Eyes grossly normal to inspection. No discharge or erythema, or obvious scleral/conjunctival abnormalities.  RESP: No audible wheeze, cough, or visible cyanosis.  No visible retractions " or increased work of breathing.    NEURO: Cranial nerves grossly intact. Mentation and speech appropriate for age.  PSYCH: Mentation appears normal, affect normal/bright, judgement and insight intact, normal speech and appearance well-groomed  Renee was seen today for hypertension.    Diagnoses and all orders for this visit:    Elevated blood pressure reading without diagnosis of hypertension      Mildly elevated blood pressure, we discussed pathophysiology, plan will be to check blood pressure 2-3 times a week at home, keep a log, exercise program with intent to lose a few pounds, with healthy lifestyle changes, cut the amount of salt to less than 2 g a day, follow-up at the clinic in about 4 to 6 weeks with blood pressure reading, consider starting medication at that time if no improvement.    Video-Visit Details    Type of service:  Video Visit    Video End Time (time video stopped): 03:20 pm  Originating Location (pt. Location): Home    Distant Location (provider location):  [facility] FAMILY MEDICINE/OB     Platform used for Video Visit: Kishor Corbin MD

## 2021-06-15 NOTE — PROGRESS NOTES
FEMALE ADULT PREVENTIVE EXAM    CHIEF COMPLAINT:  Female preventive exam.    SUBJECTIVE:  Renee Sebastian is a 37 y.o. female who presents for her routine physical exam.    Patient would like to address the following concerns today: Citalopram has been working well for Anxiety, has been on the medication for several years, did try to stop in the past but depression got worse.  Spironolactone is helping for her acne, has been managed by dermatologist.  NuvaRing is still in place and patient is pleased with the result.  Due for screening Pap smear with HPV today.      GYNE HISTORY  Menses: yes  Sexually Active: yes  Contraception: yes (Nuvaring)  Last Pap: 2014  Abnormal Pap: no  Vaginal Discharge: no      She  has no past medical history on file.    Lab Results   Component Value Date    WBC 5.7 11/18/2016    HGB 14.3 11/18/2016    HCT 41.4 11/18/2016    MCV 86 11/18/2016     11/18/2016     01/17/2018    K 4.3 01/17/2018    BUN 13 01/17/2018     Lab Results   Component Value Date    CHOL 179 01/17/2018    HDL 60 01/17/2018    LDLCALC 96 01/17/2018    TRIG 116 01/17/2018     Lab Results   Component Value Date    TSH 1.64 01/17/2018     BP Readings from Last 3 Encounters:   01/17/18 122/78   11/18/16 124/82   12/01/15 122/86       Surgeries:  No past surgical history on file.    Family History:  Her family history includes Cancer in her father; Hearing loss in her father; Heart disease in her father; Hypertension in her father.    Social History:  She  reports that she has never smoked. She does not have any smokeless tobacco history on file.    Medications:    Current Outpatient Prescriptions:      citalopram (CELEXA) 20 MG tablet, Take 1 tablet (20 mg total) by mouth daily., Disp: 90 tablet, Rfl: 3     NUVARING 0.12-0.015 mg/24 hr vaginal ring, INSERT 1 RING VAGINALLY AND LEAVE IN PLACE FOR 3 WEEKS THEN REMOVE FOR OE WEEK, Disp: 3 each, Rfl: 8     spironolactone (ALDACTONE) 50 MG tablet, , Disp: , Rfl:       ADAPALENE (DIFFERIN TOP), Apply topically., Disp: , Rfl:   HELD MEDICATIONS: None.    Allergies:  No latex allergies.  No Known Allergies         RISK BEHAVIOR & HEALTH HABITS  Self Breast Exam: yes.  Regular Exercise: yes.  Balanced diet: yes.  Seat Belt Use: yes  Calcium intake/Osteoporosis prevention: yes.    Guns: NA  Sun Screen: YES  Dental Care: YES    REVIEW OF SYSTEMS:  Complete head to toe review of systems is otherwise negative except as above.    OBJECTIVE:  VITAL SIGNS:    Vitals:    01/17/18 0948   BP: 122/78   Pulse: 64   Resp: 13   Temp: 98.3  F (36.8  C)     GENERAL:  Patient alert, in no acute distress.  EYES: PERRLA. Extraoccular movements intact, pupils equal, reactive to light and accommodation.  Normal conjunctiva and lids.    ENT:  Hearing grossly normal.  Normal appearance to ears and nose.  Bilateral TM s, external canals, oropharynx normal. Normal lips, gums and teeth.    NECK:  Supple, without thyromegaly or mass, no adenopathies.  RESP:  Clear to auscultation without crackles, wheezes or distress.  Normal respiratory effort.   CV:  Regular rate and rhythm without murmurs, rubs or gallops.  Normal pedal pulses.  No varicosities or edema.  ABDOMEN:  Soft, non-tender, without hepatosplenomegaly, masses, or hernias.   BREASTS:  deferred.    :  (chaperoned by Nia ,female CA)  External genitalia:  Normal without lesions.  Urethra: Normal appearing  Vagina: Normal without discharge  Cervix: normal  Uterus:  Nontender, mobile, without masses  Ovaries: Normal without masses  Rectal: No visible external lesions  LYMPHATIC: Normal palpation of neck.  No lymphadenopathy.  No bruising.  NEURO:  CN II-XII intact, motor & sensory function all intact.  DTR and reflexes normal.  PSYCHIATRIC:  Alert & oriented with normal mood and affect.  Good judgment and insight.  SKIN:  Normal inspection and palpation.  MUSCULOSKELETAL: Normal gait and station.  - Spine / Ribs / Pelvis: Normal inspection, ROM,  stability and strength: Spine, Head, Neck, Upper and Lower Extremities.    ASSESSMENT & PLAN  Renee was seen today for annual exam, medication refill and gynecologic exam.    Diagnoses and all orders for this visit:    Visit for preventive health examination    Overweight  -     Glycosylated Hemoglobin A1c  -     Chlamydia trachomatis & Neisseria gonorrhoeae, Amplified Detection  -     Comprehensive Metabolic Panel  -     Thyroid Stimulating Hormone (TSH)  -     Lipid Cascade FASTING    Pap smear for cervical cancer screening  -     Gynecologic Cytology (PAP Smear)    Screening for endocrine, nutritional, metabolic and immunity disorder  -     Glycosylated Hemoglobin A1c  -     Chlamydia trachomatis & Neisseria gonorrhoeae, Amplified Detection  -     Comprehensive Metabolic Panel  -     Thyroid Stimulating Hormone (TSH)  -     Lipid Humphreys FASTING    General  Immunizations reviewed and updated .  Alcohol use, safety and moderation discussed.  Recommended adequate calcium, vitamin D intake/osteoporosis prevention.  Discussed colon cancer screening at age 50, 45 if -American.  Diet and exercise reviewed, including goal of aerobic exercise 30-90 minutes most days of the week, moderation of portions sizes, avoiding eating out and fast food and increase in fruits and vegetables.  Discussed safe sex practices.    Discussed & recommended seat belt (& motorcycle helmet) use.  Discussed & recommended smoke detector.  Discussed sun protection.  Discussed weight management.  Discussed self breast exam, screening mammogram timing.  The following high BMI interventions were performed this visit: encouragement to exercise and weight loss from baseline weight    Miryam Corbin MD

## 2021-06-16 NOTE — TELEPHONE ENCOUNTER
Refill Approved    Rx renewed per Medication Renewal Policy. Medication was last renewed on 9/30/20, last OV 8/18/20.    Tomeka Campo, [facility] Triage/Med Refill 4/4/2021     Requested Prescriptions   Pending Prescriptions Disp Refills     citalopram (CELEXA) 20 MG tablet [Pharmacy Med Name: CITALOPRAM 20MG TABLETS] 135 tablet 1     Sig: TAKE 1 AND 1/2 TABLETS(30 MG) BY MOUTH DAILY       SSRI Refill Protocol  Passed - 4/2/2021 11:46 AM        Passed - PCP or prescribing provider visit in last year     Last office visit with prescriber/PCP: 3/5/2020 Miryam Corbin MD OR same dept: Visit date not found OR same specialty: 3/5/2020 Miryam Corbin MD  Last physical: 3/3/2020 Last MTM visit: Visit date not found   Next visit within 3 mo: Visit date not found  Next physical within 3 mo: Visit date not found  Prescriber OR PCP: Miryam Corbin MD  Last diagnosis associated with med order: 1. Anxiety  - citalopram (CELEXA) 20 MG tablet [Pharmacy Med Name: CITALOPRAM 20MG TABLETS]; TAKE 1 AND 1/2 TABLETS(30 MG) BY MOUTH DAILY  Dispense: 135 tablet; Refill: 1    If protocol passes may refill for 12 months if within 3 months of last provider visit (or a total of 15 months).

## 2021-06-16 NOTE — PROGRESS NOTES
FEMALE ADULT PREVENTIVE EXAM    CHIEF COMPLAINT:  Female preventive exam.    SUBJECTIVE:  Renee Sebastian is a 40 y.o. female who presents for her routine physical exam.    Patient would like to address the following concerns today: Worsening anxiety.    Pt states that her anxiety has been worsening for the last few months. There was a death in the family and there have been some disagreements about the will. Her brother-in-law also recently had a serious kidney surgery that had some complications. She also notes that she has been stressed about her current relationship.     She has had difficulty concentrating on her work lately and has often felt overwhelmed. She is currently on Citalopram 30 mg daily, but states that she might need an increase or a new medication to improve her symptoms. She sees an therapist once every 3 weeks and states that they have a good working relationship. She does not regularly see a Psychiatrist.      GYNE HISTORY  Menses: regular  Sexually Active: yes, 1 male partner  Contraception: partner vasectomy   Last Pap: 2018  Abnormal Pap: No  Vaginal Discharge: No      She  has no past medical history on file.    Lab Results   Component Value Date    WBC 7.0 04/14/2021    HGB 15.0 04/14/2021    HCT 44.5 04/14/2021    MCV 87 04/14/2021     04/14/2021     04/14/2021    K 4.1 04/14/2021    BUN 11 04/14/2021     Lab Results   Component Value Date    CHOL 165 03/03/2020    HDL 61 03/03/2020    LDLCALC 85 03/03/2020    TRIG 95 03/03/2020     Lab Results   Component Value Date    TSH 2.49 04/14/2021     BP Readings from Last 3 Encounters:   04/14/21 120/80   03/05/20 145/81   03/03/20 (!) 138/91       Surgeries:  No past surgical history on file.    Family History:  Her family history includes Cancer in her father; Hearing loss in her father; Heart disease in her father; Hypertension in her father.    Social History:  She  reports that she has never smoked. She has never used smokeless  tobacco.    Medications:    Current Outpatient Medications:      ADAPALENE (DIFFERIN TOP), Apply topically., Disp: , Rfl:      citalopram (CELEXA) 20 MG tablet, TAKE 1 AND 1/2 TABLETS(30 MG) BY MOUTH DAILY, Disp: 135 tablet, Rfl: 1     DULCOLAX, BISACODYL, ORAL, Take by mouth., Disp: , Rfl:      methylcellulose, with sugar, (CITRUCEL, SUCROSE, ORAL), Take by mouth., Disp: , Rfl:      OMEPRAZOLE ORAL, Take by mouth., Disp: , Rfl:      spironolactone (ALDACTONE) 50 MG tablet, , Disp: , Rfl:      busPIRone (BUSPAR) 5 MG tablet, Take 1 tablet (5 mg total) by mouth 2 (two) times a day., Disp: 60 tablet, Rfl: 1     etonogestreL-ethinyl estradioL (NUVARING) 0.12-0.015 mg/24 hr vaginal ring, INSERT 1 RING VAGINALLY AND LEAVE IN PLACE FOR 3 WEEKS THEN REMOVE FOR 1 WEEK, Disp: 6 each, Rfl: 8  HELD MEDICATIONS: None.    Allergies:  No latex allergies.  No Known Allergies         RISK BEHAVIOR & HEALTH HABITS  Self Breast Exam: no.  Regular Exercise: yes.  Balanced diet: yes.  Seat Belt Use: yes  Calcium intake/Osteoporosis prevention: yes.    Guns: NA  Sun Screen: YES  Dental Care: YES    REVIEW OF SYSTEMS:  Complete head to toe review of systems is otherwise negative except as above.    OBJECTIVE:  VITAL SIGNS:    Vitals:    04/14/21 1523   BP: 120/80   Pulse: 72     GENERAL:  Patient alert, in no acute distress.  EYES: PERRLA. Extraoccular movements intact, pupils equal, reactive to light and accommodation.  Normal conjunctiva and lids.    ENT:  Hearing grossly normal.  Normal appearance to ears and nose.  Bilateral TM s, external canals, oropharynx normal. Normal lips, gums and teeth.    NECK:  Supple, without thyromegaly or mass, no adenopathies.  RESP:  Clear to auscultation without crackles, wheezes or distress.  Normal respiratory effort.   CV:  Regular rate and rhythm without murmurs, rubs or gallops.  Normal pedal pulses.  No varicosities or edema.  ABDOMEN:  Soft, non-tender, without hepatosplenomegaly, masses, or  hernias.     LYMPHATIC: Normal palpation of neck.  No lymphadenopathy.  No bruising.  NEURO:  CN II-XII intact, motor & sensory function all intact.  DTR and reflexes normal.  PSYCHIATRIC:  Alert & oriented with normal mood and affect.  Good judgment and insight.  SKIN:  Normal inspection and palpation.  MUSCULOSKELETAL: Normal gait and station.  - Spine / Ribs / Pelvis: Normal inspection, ROM, stability and strength: Spine, Head, Neck, Upper and Lower Extremities.    ASSESSMENT & PLAN  Renee was seen today for annual exam.    Diagnoses and all orders for this visit:    Visit for preventive health examination    Screening for endocrine, nutritional, metabolic and immunity disorder  -     HM2(CBC w/o Differential)  -     Comprehensive Metabolic Panel  -     Thyroid Cascade    Encounter for HCV screening test for low risk patient  -     Hepatitis C Antibody (Anti-HCV)    Anxiety  -     busPIRone (BUSPAR) 5 MG tablet; Take 1 tablet (5 mg total) by mouth 2 (two) times a day.         Anxiety - Continue Citalopram as prescribed. Added buspirone 5mg daily for a week and can then increase to two times a day. Once symptoms are back in control, can taper down or return to regular Citalopram dose.     Preventative - No recent abnormal pap finding. No pap required today. No other screenings required at this time. CBC, CMP, thyroid cascade, and Hep C tests ordered. Pt should f/u in 1 year for annual preventative visit, or as needed for her anxiety.    General  Immunizations reviewed and updated .  Alcohol use, safety and moderation discussed.  Recommended adequate calcium, vitamin D intake/osteoporosis prevention.  Discussed colon cancer screening at age 50, 45 if -American.  Diet and exercise reviewed, including goal of aerobic exercise 30-90 minutes most days of the week, moderation of portions sizes, avoiding eating out and fast food and increase in fruits and vegetables.  Discussed safe sex practices.    Discussed &  recommended seat belt (& motorcycle helmet) use.  Discussed & recommended smoke detector.  Discussed sun protection.  Discussed weight management.  Discussed self breast exam, screening mammogram timing.  The following are part of a depression follow up plan for the patient:  coping support assessment, coping support management, emotional support assessment, emotional support education and emotional support management    I was present with the medical student (Vasiliy Rodriguez,MS3) who participated in the service and in the documentation of the note. I have verified the history and personally performed the physical exam and medical decision making. I agree with the assessment and plan of care as documented in the note above.    Miryam Corbin MD

## 2021-06-17 NOTE — PATIENT INSTRUCTIONS - HE
Patient Instructions by Miryam Corbin MD at 2/26/2019  4:00 PM     Author: Miryam Corbin MD Service: -- Author Type: Physician    Filed: 2/27/2019 11:00 AM Encounter Date: 2/26/2019 Status: Signed    : Miryam Corbin MD (Physician)       Patient Education     Prevention Guidelines, Women Ages 18 to 39  Screening tests and vaccines are an important part of managing your health. A screening test is done to find possible disorders or diseases in people who don't have any symptoms. The goal is to find a disease early so lifestyle changes can be made and you can be watched more closely to reduce the risk of disease, or to detect it early enough to treat it most effectively. Screening tests are not considered diagnostic, but are used to determine if more testing is needed. Health counseling is essential, too. Below are guidelines for these, for women ages 18 to 39. Talk with your healthcare provider to make sure youre up-to-date on what you need.  Screening Who needs it How often   Alcohol misuse All women in this age group At routine exams   Blood pressure All women in this age group Yearly checkup if your blood pressure is normal  Normal blood pressure is less than 120/80 mm Hg  If your blood pressure reading is higher than normal, follow the advice of your healthcare provider   Breast cancer All women in this age group should talk with their healthcare providers about the need for clinical breast exams (CBE)1 Clinical breast exam every 3 years1   Cervical cancer Women ages 21 and older Women between ages 21 and 29 should have a Pap test every 3 years; women between ages 30 and 65 are advised to have a Pap test plus an HPV test every 5 years   Chlamydia Sexually active women ages 24 and younger, and women at increased risk for infection Every 3 years if you're at risk or have symptoms   Depression All women in this age group At routine exams   Type 2 diabates, prediabetes All  women with no symptoms who are overweight or obese and have 1 or more other risk factors for diabetes At least every 3 years. Also, testing for diabetes during pregnancy after the 24th week.    Type 2 diabetes, prediabetes All women diagnosed with gestational diabetes Lifelong testing every 3 years   Type 2 diabetes All women with prediabetes Every year   Gonorrhea Sexually active women at increased risk for infection At routine exams   Hepatitis C Anyone at increased risk At routine exams   HIV All women should be tested at least once for HIV between the ages of 13 and 64 At routine exams. Those with risk factors for HIV should be tested at least annually.    Obesity All women in this age group At routine exams   Syphilis Women at increased risk for infection should talk with their healthcare provider At routine exams   Tuberculosis Women at increased risk for infection should talk with their healthcare provider Ask your healthcare provider   Vision All women in this age group At least 1 complete exam in your 20s, and 2 in your 30s   Vaccine2 Who needs it How often   Chickenpox (varicella) All women in this age group who have no record of this infection or vaccine 2 doses; the second dose should be given 4 to 8 weeks after the first dose   Hepatitis A Women at increased risk for infection should talk with their healthcare provider 2 doses given at least 6 months apart   Hepatitis B Women at increased risk for infection should talk with their healthcare provider 3 doses over 6 months; second dose should be given 1 month after the first dose; the third dose should be given at least 2 months after the second dose and at least 4 months after the first dose   Haemophilus influenzae Type B (HIB) Women at increased risk for infection should talk with their healthcare provider 1 to 3 doses   Human papillomavirus (HPV) All women in this age group up to age 26 3 doses; the second dose should be given 1 to 2 months after the  first dose and the third dose given 6 months after the first dose   Influenza (flu) All women in this age group Once a year   Measles, mumps, rubella (MMR) All women in this age group who have no record of these infections or vaccines 1 or 2 doses   Meningococcal Women at increased risk for infection should talk with their healthcare provider 1 or more doses   Pneumococcal conjugate vaccine (PCV13) and pneumococcal polysaccharide vaccine (PPSV23) Women at increased risk for infection should talk with their healthcare provider PCV13: 1 dose ages 19 to 65 (protects against 13 types of pneumococcal bacteria)  PPSV23: 1 to 2 doses through age 64, or 1 dose at 65 or older (protects against 23 types of pneumococcal bacteria)      Tetanus/diphtheria/pertussis (Td/Tdap) booster All women in this age group Td every 10 years, or a one-time dose of Tdap instead of a Td booster after age 18, then Td every 10 years   Counseling Who needs it How often   BRCA gene mutation testing for breast and ovarian cancer susceptibility Women with increased risk for having gene mutation When your risk is known   Breast cancer and chemoprevention Women at high risk for breast cancer When your risk is known   Diet and exercise Women who are overweight or obese When diagnosed, and then at routine exams   Domestic violence Women at the age in which they are able to have children At routine exams   Sexually transmitted infection prevention Women who are sexually active At routine exams   Skin cancer Prevention of skin cancer in fair-skinned adults At routine exams   Use of tobacco and the health effects it can cause All women in this age group Every visit   1According to the ACS, women ages 20 to 39 years should have a clinical breast exam (CBE) as part of their routine health exam every 3 years. Breast self-exams are an option for women starting in their 20s. But the USPSTF does not recommend CBE.  2Those who are 18 years old and not up-to-date  on their childhood vaccines should get all appropriate catch-up vaccines recommended by the CDC.  Date Last Reviewed: 10/1/2017    6312-1779 [employer] [address] [facility], [place] [address]. All rights reserved. This information is not intended as a substitute for professional medical care. Always follow your healthcare professional's instructions.

## 2021-06-18 NOTE — PATIENT INSTRUCTIONS - HE
Patient Instructions by Miryam Corbin MD at 8/18/2020  3:00 PM     Author: Miryam Corbin MD Service: -- Author Type: Physician    Filed: 8/18/2020  4:06 PM Encounter Date: 8/18/2020 Status: Signed    : Miryam Corbin MD (Physician)           Patient Education     Controlling High Blood Pressure  High blood pressure (hypertension) is often called the silent killer. This is because many people who have it, dont know it. It can be very dangerous High blood pressure can raise your risk of heart attack, stroke, heart disease, and heart failure. Controlling your blood pressure can decrease your risk of these problems. It's important to know the appropriate blood pressure range and remember to check your blood pressure regularly. Doing so can save your life.  Blood pressure measurements are given as 2 numbers. Systolic blood pressure is the upper number. This is the pressure when the heart contracts. Diastolic blood pressure is the lower number. This is the pressure when the heart relaxes between beats.  Blood pressure is categorized as normal, elevated, or stage 1 or stage 2 high blood pressure:    Normal blood pressure is systolic of less than 120 and diastolic of less than 80 (120/80)    Elevated blood pressure is systolic of 120 to 129 and diastolic less than 80    Stage 1 high blood pressure is systolic is 130 to 139 or diastolic between 80 to 89    Stage 2 high blood pressure is when systolic is 140 or higher or the diastolic is 90 or higher  A heart-healthy lifestyle can help you control your blood pressure without medicines. Here are some things you can do to pursue a heart-healthy lifestyle:    Choose heart-healthy foods    Select low-salt, low-fat foods. Limit sodium intake to 2,400 mg per day or the amount suggested by your healthcare provider.    Limit canned, dried, cured, packaged, and fast foods. These can contain a lot of salt.    Eat 8 to 10 servings of fruits and  vegetables every day.    Choose lean meats, fish, or chicken.    Eat whole-grain pasta, brown rice, and beans.    Eat 2 to 3 servings of low-fat or fat-free dairy products.    Ask your doctor about the DASH eating plan. This plan helps reduce blood pressure.    When you go to a restaurant, ask that your meal be prepared with no added salt.    Stay at a healthy weight    Ask your healthcare provider how many calories to eat a day. Then stick to that number.    Ask your healthcare provider what weight range is healthiest for you. If you are overweight, a weight loss of only 3% to 5% of your body weight can help lower blood pressure. Generally, a good weight loss goal is to lose 10% of your body weight in a year.    Limit snacks and sweets.    Get regular exercise.    Get up and get active    Find activities you enjoy that can be done alone or with friends or family. Such activities might include bicycling, dancing, walking, or jogging.    Park farther away from building entrances to walk more.    Use stairs instead of the elevator.    When you can, walk or bike instead of driving.    Bigelow leaves, garden, or do household repairs.    Be active at a moderate to vigorous level of physical activity for at least 40 minutes for a minimum of 3 to 4 days a week.     Manage stress    Make time to relax and enjoy life. Find time to laugh.    Communicate your concerns with your loved ones and your healthcare provider.    Visit with family and friends, and keep up with hobbies.    Limit alcohol and quit smoking    Men should have no more than 2 drinks per day.    Women should have no more than 1 drink per day.    Talk with your healthcare provider about quitting smoking. Smoking significantly increases your risk for heart disease and stroke. Ask your healthcare provider about community smoking cessation programs and other options.    Medicines  If lifestyle changes arent enough, your healthcare provider may prescribe high blood  pressure medicine. Take all medicines as prescribed. If you have any questions about your medicines, ask your healthcare provider before stopping or changing them.  Date Last Reviewed: 4/27/2016 2000-2019 [employer] [address] [place] [address], [place] [address]. All rights reserved. This information is not intended as a substitute for professional medical care. Always follow your healthcare professional's instructions.

## 2021-06-18 NOTE — PATIENT INSTRUCTIONS - HE
Patient Instructions by Miryam Corbin MD at 3/3/2020  2:20 PM     Author: Miryam Corbin MD Service: -- Author Type: Physician    Filed: 3/4/2020 11:31 AM Encounter Date: 3/3/2020 Status: Signed    : Miryam Corbin MD (Physician)         Patient Education     Prevention Guidelines, Women Ages 18 to 39  Screening tests and vaccines are an important part of managing your health. A screening test is done to find possible disorders or diseases in people who don't have any symptoms. The goal is to find a disease early so lifestyle changes can be made and you can be watched more closely to reduce the risk of disease, or to detect it early enough to treat it most effectively. Screening tests are not considered diagnostic, but are used to determine if more testing is needed. Health counseling is essential, too. Below are guidelines for these, for women ages 18 to 39. Talk with your healthcare provider to make sure youre up-to-date on what you need.  Screening Who needs it How often   Alcohol misuse All women in this age group At routine exams   Blood pressure All women in this age group Yearly checkup if your blood pressure is normal  Normal blood pressure is less than 120/80 mm Hg  If your blood pressure reading is higher than normal, follow the advice of your healthcare provider   Breast cancer All women in this age group should talk with their healthcare providers about the need for clinical breast exams (CBE)1 Clinical breast exam every 3 years1   Cervical cancer Women ages 21 and older Women between ages 21 and 29 should have a Pap test every 3 years; women between ages 30 and 65 are advised to have a Pap test plus an HPV test every 5 years   Chlamydia Sexually active women ages 25 and younger, and women at increased risk for infection (such as having multiple sex partners) Every year if you're at risk or have symptoms   Depression All women in this age group At routine exams    Type 2 diabetes, prediabetes All women with no symptoms who are overweight or obese and have 1 or more other risk factors for diabetes At least every 3 years. Also, testing for diabetes during pregnancy after the 24th week.    Type 2 diabetes, prediabetes All women diagnosed with gestational diabetes Lifelong testing every 3 years   Type 2 diabetes All women with prediabetes Every year   Gonorrhea Sexually active women at increased risk for infection At routine exams   Hepatitis C Anyone at increased risk At routine exams   HIV All women should be tested at least once for HIV between the ages of 13 and 64 At routine exams. Those with risk factors for HIV should be tested at least annually.    Obesity All women in this age group At routine exams   Syphilis Women at increased risk for infection should talk with their healthcare provider At routine exams   Tuberculosis Women at increased risk for infection should talk with their healthcare provider Ask your healthcare provider   Vision All women in this age group At least 1 complete exam in your 20s, and 2 in your 30s   Vaccine2 Who needs it How often   Chickenpox (varicella) All women in this age group who have no record of this infection or vaccine 2 doses; the second dose should be given 4 to 8 weeks after the first dose   Hepatitis A Women at increased risk for infection should talk with their healthcare provider 2 doses given at least 6 months apart   Hepatitis B Women at increased risk for infection should talk with their healthcare provider 3 doses over 6 months; second dose should be given 1 month after the first dose; the third dose should be given at least 2 months after the second dose and at least 4 months after the first dose   Haemophilus influenzae Type B (HIB) Women at increased risk for infection should talk with their healthcare provider 1 to 3 doses   Human papillomavirus (HPV) All women in this age group up to age 26 3 doses; the second dose  should be given 1 to 2 months after the first dose and the third dose given 6 months after the first dose   Influenza (flu) All women in this age group Once a year   Measles, mumps, rubella (MMR) All women in this age group who have no record of these infections or vaccines 1 or 2 doses   Meningococcal Women at increased risk for infection should talk with their healthcare provider 1 or more doses   Pneumococcal conjugate vaccine (PCV13) and pneumococcal polysaccharide vaccine (PPSV23) Women at increased risk for infection should talk with their healthcare provider PCV13: 1 dose ages 19 to 65 (protects against 13 types of pneumococcal bacteria)  PPSV23: 1 to 2 doses through age 64, or 1 dose at 65 or older (protects against 23 types of pneumococcal bacteria)      Tetanus/diphtheria/pertussis (Td/Tdap) booster All women in this age group Td every 10 years, or a one-time dose of Tdap instead of a Td booster after age 18, then Td every 10 years   Counseling Who needs it How often   BRCA gene mutation testing for breast and ovarian cancer susceptibility Women with increased risk for having gene mutation When your risk is known   Breast cancer and chemoprevention Women at high risk for breast cancer When your risk is known   Diet and exercise Women who are overweight or obese When diagnosed, and then at routine exams   Domestic violence Women at the age in which they are able to have children At routine exams   Sexually transmitted infection prevention Women who are sexually active At routine exams   Skin cancer Prevention of skin cancer in fair-skinned adults At routine exams   Use of tobacco and the health effects it can cause All women in this age group Every visit   1 According to the ACS, women ages 20 to 39 years should have a clinical breast exam (CBE) as part of their routine health exam every 3 years. Breast self-exams are an option for women starting in their 20s. But the USPSTF does not recommend CBE.  Date  Last Reviewed: 10/1/2017    0127-9607 [employer]. [address] [place] [address], [place] [address]. All rights reserved. This information is not intended as a substitute for professional medical care. Always follow your healthcare professional's instructions.

## 2021-06-20 NOTE — LETTER
Letter by Miryam Corbin MD at      Author: Miryam Corbin MD Service: -- Author Type: --    Filed:  Encounter Date: 3/3/2020 Status: (Other)                    My Depression Action Plan  Name: Renee Sebastian   Date of Birth 1980  Date: 3/3/2020    My Doctor: Miryam Corbin MD   My Clinic: [facility]  [address] [address]  [phone]          GREEN    ZONE   Good Control    What it looks like:     Things are going generally well. You have normal ups and downs. You may even feel depressed from time to time, but bad moods usually last less than a day.   What you need to do:  1. Continue to care for yourself (see self care plan)  2. Check your depression survival kit and update it as needed  3. Follow your physicians recommendations including any medication.  4. Do not stop taking medication unless you consult with your physician first.           YELLOW         ZONE Getting Worse    What it looks like:     Depression is starting to interfere with your life.     It may be hard to get out of bed; you may be starting to isolate yourself from others.    Symptoms of depression are starting to last most all day and this has happened for several days.     You may have suicidal thoughts but they are not constant.   What you need to do:     1. Call your care team. Your response to treatment will improve if you keep your care team informed of your progress. Yellow periods are signs an adjustment may need to be made.     2. Continue your self-care.  Just get dressed and ready for the day.  Don't give yourself time to talk yourself out of it.    3. Talk to someone in your support network.    4. Open up your depression Depression Self-Care Plan / Wellness kit.           RED    ZONE Medical Alert - Get Help    What it looks like:     Depression is seriously interfering with your life.     You may experience these or other symptoms: You cant get out of  bed most days, cant work or engage in other necessary activities, you have trouble taking care of basic hygiene, or basic responsibilities, thoughts of suicide or death that will not go away, self-injurious behavior.     What you need to do:  1. Call your care team and request a same-day appointment. If they are not available (weekends or after hours) call your local crisis line, emergency room or 911.            Self-Care Plan / Wellness Kit    Self-Care for Depression  Heres the deal. Your body and mind are really not as separate as most people think.  What you do and think affects how you feel and how you feel influences what you do and think. This means if you do things that people who feel good do, it will help you feel better.  Sometimes this is all it takes.  There is also a place for medication and therapy depending on how severe your depression is, so be sure to consult with your medical provider and/ or Behavioral Health Consultant if your symptoms are worsening or not improving.     In order to better manage my stress, I will:    Exercise  Get some form of exercise, every day. This will help reduce pain and release endorphins, the feel good chemicals in your brain. This is almost as good as taking antidepressants!  This is not the same as joining a gym and then never going! (they count on that by the way?) It can be as simple as just going for a walk or doing some gardening, anything that will get you moving.      Hygiene   Maintain good hygiene (get out of bed in the morning, make your bed, brush your teeth, take a shower, and get dressed like you were going to work, even if you are unemployed).  If your clothes don't fit try to get ones that do.    Diet  Strive to eat foods that are good for me, drink plenty of water, and avoid excessive sugar, caffeine, alcohol, and other mood-altering substances.  Some foods that are helpful in depression are: complex carbohydrates, B vitamins, flaxseed, fish or fish  oil, fresh fruits and vegetables.    Psychotherapy  Agree to participate in Individual Therapy (if recommended).    Medication  If prescribed medications, I agree to take them.  Missing doses can result in serious side effects.  I understand that drinking alcohol, or other illicit drug use, may cause potential side effects.  I will not stop my medication abruptly without first discussing it with my provider.    Staying Connected With Others  Stay in touch with my friends, family members, and my primary care provider/team.    Use your imagination  Be creative.  We all have a creative side; it doesnt matter if its oil painting, sand castles, or mud pies! This will also kick up the endorphins.    Witness Beauty  (AKA stop and smell the roses) Take a look outside, even in mid-winter. Notice colors, textures. Watch the squirrels and birds.     Service to others  Be of service to others.  There is always someone else in need.  By helping others we can get out of ourselves and remember the really important things.  This also provides opportunities for practicing all the other parts of the program.    Humor  Laugh and be silly!  Adjust your TV habits for less news and crime-drama and more comedy.    Control your stress  Try breathing deep, massage therapy, biofeedback, and meditation. Find time to relax each day.     Crisis Text Line  http://www.crisistextline.org    The Crisis Text Line serves anyone, in any type of crisis, providing access to free, 24/7 support and information via the medium people already use and trust:    Here's how it works:  1.  Text 659-157 from anywhere in the [place], anytime, about any type of crisis.  2.  A live, trained Crisis Counselor receives the text and responds quickly.  3.  The volunteer Crisis Counselor will help you move from a 'hot moment to a cool moment'.  My support system    Clinic Contact:  Phone number:    Contact 1:  Phone number:    Contact 2:  Phone number:    [employer]/spiritual  advisor:  Phone number:    Therapist:  Phone number:    [facility]:    Phone number:    Other community support:  Phone number:

## 2021-06-20 NOTE — PROGRESS NOTES
"OFFICE VISIT - FAMILY MEDICINE     ASSESSMENT AND PLAN     1. Trochanteric bursitis, right hip  Ambulatory referral to Physical Therapy       Continue conservative management at home to include rest, ice, stretching, and NSAIDs as needed. Recommend physical therapy consult. If pain does not improve with therapy, follow-up in clinic.   Seasonal flu shot was given today.  CHIEF COMPLAINT   Hip Pain (\"R, UPPER OUTSIDE X WOJCIECH AFTER STARTED COUCH TO 5K RUNNING PROGRAM, BURSITIS\") and Immunizations (FU SHOT)    HPI   Renee Sebastian is a 38 y.o. female who comes to clinic for evaluation of intermittent right hip pain that started in June about 3 weeks into a running program. She had not ran in several years before starting this program. She states that the pain came on slowly with some other minor aches and pains, but then got much worse with continued running. It is sharp and does not move from her right hip. She has not heard or felt any popping or snapping. After googling her symptoms, she thought she had bursitis and followed recommended treatment: rest, ice, foam roller and ibuprofen. This did improve the pain, so that she doesn't notice it during most of the day, but any high impact movement like jumping or running cause it to return. She is frustrated that she hasn't been able to start running again. She has no history of hip surgery or lower extremity fracture. No family history of rheumatologic disease. No personal history of gout. Updated MIIC - Needs TDAP      Review of Systems As per HPI, otherwise negative.    OBJECTIVE   /88 (Patient Site: Right Arm)  Pulse 68  Temp 98.5  F (36.9  C) (Oral)   Resp 13  Ht 5' 3\" (1.6 m)  Wt 164 lb (74.4 kg)  LMP 09/24/2018 (Exact Date)  BMI 29.05 kg/m2  Physical Exam   Constitutional: She is oriented to person, place, and time. She appears well-developed and well-nourished. No distress.   HENT:   Head: Normocephalic and atraumatic.   Neck: Normal range of motion. "   Cardiovascular: Normal rate, regular rhythm and intact distal pulses.    Pulmonary/Chest: Effort normal. No respiratory distress.   Abdominal: Soft. She exhibits no distension.   Musculoskeletal: Normal range of motion.        Right hip: She exhibits tenderness (greater trochanter). She exhibits normal range of motion, normal strength, no swelling and no crepitus.   Pain at right trochanter elicited with flexion, adduction, and external rotation.    Neurological: She is alert and oriented to person, place, and time. She has normal strength. Gait normal.   Skin: Skin is warm and dry.   Psychiatric: She has a normal mood and affect.       [facility]     Family History   Problem Relation Age of Onset     Cancer Father      Hearing loss Father      Hypertension Father      Heart disease Father      Social History     Social History     Marital status: Single     Spouse name: N/A     Number of children: N/A     Years of education: N/A     Occupational History     Not on file.     Social History Main Topics     Smoking status: Never Smoker     Smokeless tobacco: Never Used     Alcohol use Not on file     Drug use: Not on file     Sexual activity: Not on file     Other Topics Concern     Not on file     Social History Narrative     Relevant history was reviewed with the patient today, unless noted in HPI, nothing is pertinent for this visit.  [facility]     Patient Active Problem List    Diagnosis Date Noted     Anxiety associated with depression 12/01/2015     Vitamin D deficiency disease 05/12/2015     Basal cell carcinoma of nose 05/07/2015     Acne      Overview Note:     Created by Conversion         Anxiety      Overview Note:     Created by Conversion    Replacement Utility updated for latest IMO load       No past surgical history on file.    RESULTS/CONSULTS (Lab/Rad)   No results found for this or any previous visit (from the past 168 hour(s)).  No results found.  MEDICATIONS     Current Outpatient Prescriptions on File  Prior to Visit   Medication Sig Dispense Refill     ADAPALENE (DIFFERIN TOP) Apply topically.       citalopram (CELEXA) 20 MG tablet Take 1 tablet (20 mg total) by mouth daily. 90 tablet 3     NUVARING 0.12-0.015 mg/24 hr vaginal ring INSERT 1 RING VAGINALLY AND LEAVE IN PLACE FOR 3 WEEKS THEN REMOVE FOR 1 WEEK 1 each 11     spironolactone (ALDACTONE) 50 MG tablet        No current facility-administered medications on file prior to visit.        HEALTH MAINTENANCE / SCREENING   PHQ-2 Total Score: 0 (9/24/2018  5:00 PM), PHQ-9 Total Score: 1 (9/24/2018  5:00 PM),DOLORES-7 Total: 4 (1/17/2018 10:00 AM)  Immunization History   Administered Date(s) Administered     DTaP, historic 07/21/1983     Dtap 07/21/1983     HPV Quadrivalent 03/20/2007     Hep A, Adult IM (19yr & older) 03/07/2006, 03/20/2007     Hep A, historic 03/07/2006, 03/20/2007     Hep B, Adult 01/18/2005, 02/23/2005, 03/07/2006     Hep B, historic 01/18/2005, 02/23/2005, 03/07/2006     IPV 07/21/1983     Influenza, Seasonal, Inj PF IIV3 11/19/2010, 09/21/2012     Influenza, inj, historic,unspecified 11/19/2010, 09/21/2012, 12/04/2014, 10/29/2015     Influenza,seasonal quad, PF, 36+MOS 12/04/2014, 10/29/2015, 11/18/2016, 10/03/2017     Meningococcal MCV4P 03/20/2007     Meningococcal MPSV4, SQ 03/20/2007     Health Maintenance   Topic     TD 18+ HE      TDAP ADULT ONE TIME DOSE      DEPRESSION FOLLOW UP      ADVANCE DIRECTIVES DISCUSSED WITH PATIENT      PAP SMEAR      INFLUENZA VACCINE RULE BASED        Miryam Corbin MD  Family Medicine, [facility]   Patient was seen and examined with the Medical student (Mc Valderrama)    This note was dictated using a voice recognition software.  Any grammatical or context distortion are unintentional and inherent to the software.

## 2021-06-21 NOTE — PROGRESS NOTES
[facility] Daily Progress     Patient Name: Renee Cainland  Date: 2018  Visit #: 2  PTA visit #:  0  Referral Diagnosis: Trochanteric bursitis, right hip  Referring provider: Miryam Corbin *  Visit Diagnosis:     ICD-10-CM    1. Right hip pain M25.551    2. Weakness of right hip R29.898          Assessment:     HEP/POC compliance is  good .  Patient is appropriate to continue with skilled physical therapy intervention, as indicated by initial plan of care.    Goal Status:  Pt. will demonstrate/verbalize independence in self-management of condition in : 6 weeks  Pt. will be independent with home exercise program in : 6 weeks  Pt. will be able to walk : for exercise/recreation;with no pain;in 12 weeks;Comment  Comment:: without limits  Patient will return to: sport;exercise;in 12 weeks;Comment  Comment: running  No Data Recorded    Plan / Patient Education:     Continue with initial plan of care.  Progress with home program as tolerated.    Subjective:     Pain Ratin  Feeling about the same. Working on the exercises.       Objective:     Cranial scan is (+) for (R) hip/pelvic periosteum  No tenderness of post pelvis. Mod tenderness of (R) pubis,  (R) PSIS post in supine.     Treatment Today     TREATMENT MINUTES COMMENTS   Evaluation     Self-care/ Home management     Manual therapy 25 Induction, indirect, direct techniques utilized as appropriate for optimal tissue release.   MFR/SCS -  (R) ILIF (P), (R) ALT, (R) hemipelvis, (R) SGL-LV, lat trochanter   Neuromuscular Re-education     Therapeutic Activity     Therapeutic Exercises 5 nc Exercises per flow sheet.   Added band to bridging.    Gait training     Modality__________________                Total 30    Blank areas are intentional and mean the treatment did not include these items.       Kaye Giraldo  2018

## 2021-06-21 NOTE — PROGRESS NOTES
[facility] Daily Progress     Patient Name: Renee Sebastian  Date: 2018  Visit #: 3  PTA visit #:  0  Referral Diagnosis: Trochanteric bursitis, right hip  Referring provider: Miryam Corbin *  Visit Diagnosis:     ICD-10-CM    1. Right hip pain M25.551    2. Weakness of right hip R29.898          Assessment:     Response to Intervention fair  Patient is appropriate to continue with skilled physical therapy intervention, as indicated by initial plan of care.    Goal Status:  Pt. will demonstrate/verbalize independence in self-management of condition in : 6 weeks  Pt. will be independent with home exercise program in : 6 weeks  Pt. will be able to walk : for exercise/recreation;with no pain;in 12 weeks;Comment  Comment:: without limits  Patient will return to: sport;exercise;in 12 weeks;Comment  Comment: running    No Data Recorded    Plan / Patient Education:     Continue with initial plan of care.  Progress with home program as tolerated.    Subjective:     Pain Ratin  Doing about the same. Had some muscle soreness after the last session, just for a day.       Objective:     Mild tenderness of (R) post pelvis.   Mod difficulty with SL glut medius, (R) SLS with pelvic stabilization.   Mod tightness of (B) hip ER.    Treatment Today     TREATMENT MINUTES COMMENTS   Evaluation     Self-care/ Home management     Manual therapy 15 Induction, indirect, direct techniques utilized as appropriate for optimal tissue release.   MFR/SCS - prone LS traction, sacral release, (R) UPL5, (R) SGL-LV   Neuromuscular Re-education     Therapeutic Activity     Therapeutic Exercises 10 Exercises per flow sheet.    Gait training     Modality__________________                Total 25    Blank areas are intentional and mean the treatment did not include these items.       Kaye Giraldo  2018

## 2021-06-21 NOTE — PROGRESS NOTES
[facility] Daily Progress     Patient Name: Renee Cainland  Date: 2018  Visit #: 3  PTA visit #:  0  Referral Diagnosis: Trochanteric bursitis, right hip  Referring provider: Miryam Corbin *  Visit Diagnosis:     ICD-10-CM    1. Right hip pain M25.551    2. Weakness of right hip R29.898          Assessment:     HEP/POC compliance is  good .  Patient demonstrates understanding/independence with home program.  Patient is appropriate to continue with skilled physical therapy intervention, as indicated by initial plan of care.    Goal Status:  Pt. will demonstrate/verbalize independence in self-management of condition in : 6 weeks  Pt. will be independent with home exercise program in : 6 weeks  Pt. will be able to walk : for exercise/recreation;with no pain;in 12 weeks;Comment  Comment:: without limits  Patient will return to: sport;exercise;in 12 weeks;Comment  Comment: running    No Data Recorded    Plan / Patient Education:     Continue with initial plan of care.  Progress with home program as tolerated.    Subjective:     Pain Ratin  Pain is intermittent. Comes and goes with certain activities. Sometimes with pushing through that leg, but stairs, rising from sitting doesn't bother it much.        Objective:     Tenderness of ant/post hip and ant pelvis.     Treatment Today     TREATMENT MINUTES COMMENTS   Evaluation     Self-care/ Home management     Manual therapy 25 Induction, indirect, direct techniques utilized as appropriate for optimal tissue release.   MFR/SCS - (R) AR8-MS (TP on (L) side), (R) TUBAB (P), (R) FEME (P), (R) lat trochanter   Neuromuscular Re-education     Therapeutic Activity     Therapeutic Exercises     Gait training     Modality__________________                Total 25    Blank areas are intentional and mean the treatment did not include these items.       Kaye Giraldo  2018

## 2021-06-21 NOTE — PROGRESS NOTES
[facility]   Hip Initial Evaluation    Patient Name: Renee Sebastian  Date of evaluation: 11/1/2018  Visit #1  Referral Diagnosis: Trochanteric bursitis, right hip  Referring provider: Miryam Corbin *  Visit Diagnosis:     ICD-10-CM    1. Right hip pain M25.551    2. Weakness of right hip R29.898        Assessment:     Renee Sebastian is a 38 y.o. female who presents to therapy today with chief complaints of (R) lat hip pain. Onset date of sx was 6/18.  Functional impairments include running, jumping, exercise, prolonged walking, lying on (R) side.  Clinical findings include pelvic asymmetry, toe out positioning in walking, decreased (R) hip stability in balance, squats, hip ROM WNL, decreased hip strength, tenderness of (R) lat hip/pelvis.          Pt. is appropriate for skilled PT intervention as outlined in the Plan of Care (POC).  Pt. is a good candidate for skilled PT services to improve pain levels and function.    Goals:  Pt. will demonstrate/verbalize independence in self-management of condition in : 6 weeks  Pt. will be independent with home exercise program in : 6 weeks  Pt. will be able to walk : for exercise/recreation;with no pain;in 12 weeks;Comment  Comment:: without limits  Patient will return to: sport;exercise;in 12 weeks;Comment  Comment: running  No Data Recorded    Patient's expectations/goals are realistic.    Barriers to Learning or Achieving Goals:  No Barriers.       Plan / Patient Instructions:        Plan of Care:   Authorization / Certification Number of Visits: [facility]  Communication with: Referral Source  Patient Related Instruction: Nature of Condition;Treatment plan and rationale;Self Care instruction;Basis of treatment;Body mechanics;Next steps  Times per Week: 1-2  Number of Weeks: 6-12  Number of Visits: up to 12  Discharge Planning: HEP, self management  Precautions / Restrictions : none  Therapeutic Exercise: ROM;Stretching;Strengthening  Neuromuscular  Reeducation: posture;balance/proprioception;core  Manual Therapy: strain counterstrain;myofascial release    Plan for next visit: manual therapy, progression of home program including WB exercises.      Subjective:        Social information:   Living Situation:   Occupation:   Work Status:Working full time   Equipment Available: None    History of Present Illness:    Renee is a 38 y.o. female who presents to therapy today with complaints of (R) lat hip pain. Some radiation into (R) post pelvic. Date of onset/duration of symptoms is . She started a couch to 5K program, had onset of pain and continued to train for 4-5 weeks. She stopped running, attempted some self treatment without benefit. Onset was gradual. Symptoms are intermittent. She denies history of similar symptoms. She describes their previous level of function as not limited.  She is walking, using elliptical , some lower body strengthening. Denies any current/past back problems.     Pain Ratin  Pain rating at best: 1  Pain rating at worst: 6  Pain description: pain    Functional limitations are described as occurring with:   sports or recreation running, jumping, high impact activities  standing 1 hour  walking prolonged  sometimes in (R) SL    Doing a lot of stairs    Patient reports benefit from:  rest      No past medical history on file.  No past surgical history on file.  Patient Active Problem List   Diagnosis     Anxiety     Acne     Basal cell carcinoma of nose     Vitamin D deficiency disease     Anxiety associated with depression        Objective:      Note: Items left blank indicates the item was not performed or not indicated at the time of the evaluation.    Patient Outcome Measures :    Lower Extremity Functional Scale (_/80): 64   Scores range from 0-80, where a score of 80 represents maximum function. The minimal clinically important difference is a positive change of 9 points.    Hip Examination  1. Right hip pain    "  2. Weakness of right hip       Involved Side: Right  Posture Observation:      Lumbopelvic complex: Pelvic alignment: (R) crest depressed, (R) PSIS, sacral base post in standing  Lower extremity: arch height WNL  Assistive Device: None  Gait Observation: toe out R>L  Single leg balance: 30\" (B), trendelenberg R>L, (L) hip drop in (R) stance  Partial squat: no pain, toe out positioning, genu valgus in squat position  Lumbar Clearing:NA    Hip ROM:    Date:      Hip ROM( ) AROM in degrees AROM in degrees AROM in degrees    Right Left Right Left Right Left   Hip Flexion (0-120 )         Hip Abduction (0-45 )         Hip External Rotation (0-50 )         Hip Internal Rotation (0-40 )         Hip Extension (0-15 )          PROM in degrees PROM in degrees PROM in degrees    Right Left Right Left Right Left   Hip Flexion (0-120 ) WNL        Hip Abduction (0-45 ) WNL        Hip External Rotation (0-50 ) WNL        Hip Internal Rotation (0-40 ) WNL        Hip Extension (0-15 )           Hip/Knee Strength     Date:      Hip/Knee Strength (/5) MMT MMT MMT    Right Left Right Left Right Left   Hip Flexion 5 5       Hip Abduction 5-        Hip Adduction         Hip Extension 4+ overuse of hamstrings        Hip External Rotation 5 5       Hip Internal Rotation 5 5       Knee Extension 5 5       Knee Flexion             Hip Special Tests     OA Right (+/-) Left (+/-) Intra Articular Right (+/-) Left (+/-)   Hip Scour   JAY tight tight   Test Cluster  -Hip pain  -Hip IR <15   -Hip Flex <115    FADIR Lat hip pain    Test Cluster  -Painful Hip IR  ->50 years old  -Morning Stiffness <60 min   Passive Supine Rotation Test     Misc. Right (+/-) Left (+/-) Stinchfield Test (SLR Against Resistance)     Ely s   JILRIT     Rodriguez s   DIRI     Trendelenburg   Posterior Rim Impingement     SIJ Right (+/-) Left (+/-) Lateral Rim Impingement     SIJ Compression   Other SLR     SIJ Distraction   Other     POSH Test   Other     Sacral Thrust   " Other         Flexibility:    Palpation: tender points noted (R) TUBAB(P), (R) inf SI joint, (R) post/sup lat hip (SGL-LV)    Treatment Today     TREATMENT MINUTES COMMENTS   Evaluation 35    Self-care/ Home management     Manual therapy 10 Induction, indirect, direct techniques utilized as appropriate for optimal tissue release.   MFR/SCS - (R) TUBAB(P), (R) FST(P), (R) SGL-LV   Neuromuscular Re-education     Therapeutic Activity     Therapeutic Exercises 10 Plan of care and goals developed in collaboration with patient.   Discussed findings, anatomy, instructed in exercises.  Exercises per flow sheet.    Gait training     Modality__________________                Total 55    Blank areas are intentional and mean the treatment did not include these items.     PT Evaluation Code: (Please list factors)  Patient History/Comorbidities: none  Examination: 2  Clinical Presentation: stable  Clinical Decision Making: low    Patient History/  Comorbidities Examination  (body structures and functions, activity limitations, and/or participation restrictions) Clinical Presentation Clinical Decision Making (Complexity)   No documented Comorbidities or personal factors 1-2 Elements Stable and/or uncomplicated Low   1-2 documented comorbidities or personal factor 3 Elements Evolving clinical presentation with changing characteristics Moderate   3-4 documented comorbidities or personal factors 4 or more Unstable and unpredictable High                Kaye Giraldo  11/1/2018  3:30 PM

## 2021-06-22 NOTE — PROGRESS NOTES
[facility] Daily Progress     Patient Name: Renee Sebastian  Date: 2019  Visit #: 9  PTA visit #:  0  Referral Diagnosis: Trochanteric bursitis, right hip  Referring provider: Miryam Corbin *   Visit Diagnosis:     ICD-10-CM    1. Right hip pain M25.551    2. Weakness of right hip R29.898          Assessment:     HEP/POC compliance is  fair .  Response to Intervention fair  Patient is appropriate to continue with skilled physical therapy intervention, as indicated by initial plan of care.    Goal Status:  Pt. will demonstrate/verbalize independence in self-management of condition in : 6 weeks;Progressing toward  Pt. will be independent with home exercise program in : 6 weeks;Progressing toward  Pt. will be able to walk : for exercise/recreation;with no pain;in 12 weeks;Comment;Met  Comment:: without limits  Patient will return to: sport;exercise;in 12 weeks;Partially met  Comment: running    status: she doesn't anticipate she'll be running this winter.     No Data Recorded    Plan / Patient Education:     Continue with initial plan of care.  Progress with home program as tolerated.    Subjective:     Pain Ratin  Semi compliant with exercises. Less pinching with the stretching. Feeling the same. Main complaints are pain with rising after prolonged sitting and when getting out of bed in the morning. Pain lasts for a few minutes, rated 1/10.      Objective:     Continues to demonstrate capsular restrictions with ROM testing.  JAY is moderately (+).    Treatment Today     TREATMENT MINUTES COMMENTS   Evaluation     Self-care/ Home management nc Discussed pacing/training principles if she returns to a running program in the spring.   Verbal instruct on bridging ex progression - adding reps or leg kick.   Manual therapy 25   Mobilization - (R) hip distraction with belt 90/90 positioning, neutral/ER gr.II-III, ant glide gr. II-III in prone crawl, post glides in 90/90 Gr. II-III, supine long  axis distraction Gr. II-III   Neuromuscular Re-education     Therapeutic Activity     Therapeutic Exercises     Gait training     Modality__________________                Total 25    Blank areas are intentional and mean the treatment did not include these items.       Kaye Giraldo  1/4/2019

## 2021-06-22 NOTE — PROGRESS NOTES
[facility] Daily Progress     Patient Name: Renee Cainland  Date: 2018  Visit #: 7  PTA visit #:  0  Referral Diagnosis: Trochanteric bursitis, right hip  Referring provider: Mriyam Corbin *  Visit Diagnosis:     ICD-10-CM    1. Right hip pain M25.551    2. Weakness of right hip R29.898          Assessment:     HEP/POC compliance is  good .  Patient demonstrates understanding/independence with home program.  Response to Intervention good  Patient is appropriate to continue with skilled physical therapy intervention, as indicated by initial plan of care.    Goal Status:  Pt. will demonstrate/verbalize independence in self-management of condition in : 6 weeks;Progressing toward  Pt. will be independent with home exercise program in : 6 weeks;Progressing toward  Pt. will be able to walk : for exercise/recreation;with no pain;in 12 weeks;Comment;Partially met  Comment:: without limits  Patient will return to: sport;exercise;in 12 weeks;Progressing toward  Comment: running    status: she doesn't anticipate she'll be running this winter.     No Data Recorded    Plan / Patient Education:     Continue with initial plan of care.  Progress with home program as tolerated.    Subjective:     Pain Ratin  Feeling really good today. Walked a couple miles and it felt pretty good. Notes she isn't able to reproduce the pain as easily as in the past. Still notices with rising from sitting.       Objective:     Continued restrictions of (R) hip ER.    Treatment Today     TREATMENT MINUTES COMMENTS   Evaluation     Self-care/ Home management     Manual therapy 25 Induction, indirect, direct techniques utilized as appropriate for optimal tissue release.   MFR/SCS - (R) protrusive hip  Mobilization - (R) hip distraction with belt 90/90 positioning, neutral/ER gr.II-III, supine long axis distraction Gr. II-III   Neuromuscular Re-education     Therapeutic Activity     Therapeutic Exercises     Gait training      Modality__________________                Total 25    Blank areas are intentional and mean the treatment did not include these items.       Kaye Giraldo  12/17/2018

## 2021-06-22 NOTE — PROGRESS NOTES
[facility] Daily Progress     Patient Name: Renee Sebastian  Date: 2018  Visit #: 6  PTA visit #:  0  Referral Diagnosis: Trochanteric bursitis, right hip  Referring provider: Miryam Corbin *  Visit Diagnosis:     ICD-10-CM    1. Right hip pain M25.551    2. Weakness of right hip R29.898          Assessment:     HEP/POC compliance is  good .  Response to Intervention good  Patient is appropriate to continue with skilled physical therapy intervention, as indicated by initial plan of care.    Goal Status:  Pt. will demonstrate/verbalize independence in self-management of condition in : 6 weeks;Progressing toward  Pt. will be independent with home exercise program in : 6 weeks;Progressing toward  Pt. will be able to walk : for exercise/recreation;with no pain;in 12 weeks;Comment;Progressing toward  Comment:: without limits  Patient will return to: sport;exercise;in 12 weeks;Progressing toward  Comment: running    No Data Recorded    Plan / Patient Education:     Continue with initial plan of care.  Progress with home program as tolerated.    Subjective:     Pain Ratin  New exercises are getting easier. Doing elliptical 3x/wk for 30-40 min without symptoms. Hasn't been walking much for exercise. Still has 1/10 pain with rising from sitting, resolves after a min or 2.       Objective:     JAY: (L) mild restriction, (R) mod restriction    Treatment Today     TREATMENT MINUTES COMMENTS   Evaluation     Self-care/ Home management     Manual therapy 25 Induction, indirect, direct techniques utilized as appropriate for optimal tissue release.   MFR/SCS - (R) protrusive hip  Mobilization - (R) hip distraction with belt 90/90 positioning gr.II-III  Pt education on capsular restrictions of hip associated with jt compression   Neuromuscular Re-education     Therapeutic Activity     Therapeutic Exercises     Gait training     Modality__________________                Total 25    Blank areas are  intentional and mean the treatment did not include these items.       Kaye Giraldo  12/13/2018

## 2021-06-22 NOTE — PROGRESS NOTES
[facility] Daily Progress     Patient Name: Renee Sebastian  Date: 2018  Visit #: 8  PTA visit #:  0  Referral Diagnosis: Trochanteric bursitis, right hip  Referring provider: Miryam Corbin *  Visit Diagnosis:     ICD-10-CM    1. Right hip pain M25.551    2. Weakness of right hip R29.898          Assessment:     HEP/POC compliance is  fair .  Patient demonstrates understanding/independence with home program.  Response to Intervention good  Patient is appropriate to continue with skilled physical therapy intervention, as indicated by initial plan of care.    Goal Status:  Pt. will demonstrate/verbalize independence in self-management of condition in : 6 weeks;Progressing toward  Pt. will be independent with home exercise program in : 6 weeks;Progressing toward  Pt. will be able to walk : for exercise/recreation;with no pain;in 12 weeks;Comment;Partially met  Comment:: without limits  Patient will return to: sport;exercise;in 12 weeks;Progressing toward  Comment: running    status: she doesn't anticipate she'll be running this winter.     No Data Recorded    Plan / Patient Education:     Continue with initial plan of care.  Progress with home program as tolerated.    Subjective:     Pain Ratin  Hasn't done the exercises for a couple days. Occasional pain if she sits a certain way.       Objective:     Cont mod capsular tightness with (R) JAY.  SLS stability on (R) improving with step exercises.     Treatment Today     TREATMENT MINUTES COMMENTS   Evaluation     Self-care/ Home management     Manual therapy 20 Induction, indirect, direct techniques utilized as appropriate for optimal tissue release.   MFR/SCS - (R) protrusive hip  Mobilization - (R) hip distraction with belt 90/90 positioning, neutral/ER gr.II-III, supine long axis distraction Gr. II-III   Neuromuscular Re-education     Therapeutic Activity     Therapeutic Exercises 10 Exercises per flow sheet.    Gait training      Modality__________________                Total 30    Blank areas are intentional and mean the treatment did not include these items.       Kaye Giraldo  12/26/2018

## 2021-06-22 NOTE — PROGRESS NOTES
[facility] Daily Progress     Patient Name: Renee Sebastian  Date: 2018  Visit #: 4  PTA visit #:  0  Referral Diagnosis: Trochanteric bursitis, right hip  Referring provider: Miryam Corbin *  Visit Diagnosis:     ICD-10-CM    1. Right hip pain M25.551    2. Weakness of right hip R29.898          Assessment:     Patient demonstrates understanding/independence with home program.  Patient is appropriate to continue with skilled physical therapy intervention, as indicated by initial plan of care.    Goal Status:  Pt. will demonstrate/verbalize independence in self-management of condition in : 6 weeks;Progressing toward  Pt. will be independent with home exercise program in : 6 weeks;Progressing toward  Pt. will be able to walk : for exercise/recreation;with no pain;in 12 weeks;Comment;Progressing toward  Comment:: without limits  Patient will return to: sport;exercise;in 12 weeks;Progressing toward  Comment: running    No Data Recorded    Plan / Patient Education:     Continue with initial plan of care.  Progress with home program as tolerated.    Subjective:     Pain Ratin   Exercises are going well. Standing exercise is more challenging. Pain is about the same. Has some generalized soreness after a harder work out this week.  Sx are intermittent. No pain at rest, pain with rising from sitting after a prolonged period, pain with certain movements.      Objective:     Fair pelvic/core stabilization with SLS activities on the (R).     Treatment Today     TREATMENT MINUTES COMMENTS   Evaluation     Self-care/ Home management     Manual therapy 15 Induction, indirect, direct techniques utilized as appropriate for optimal tissue release.   MFR/SCS - (R) PLT, (R) hemipelvis, (R iliacus   Neuromuscular Re-education     Therapeutic Activity     Therapeutic Exercises 10 Exercises per flow sheet. Added side step ups for lateral hip strength.    Gait training     Modality__________________                 Total 25    Blank areas are intentional and mean the treatment did not include these items.       Kaye Giraldo  11/30/2018

## 2021-06-23 NOTE — TELEPHONE ENCOUNTER
Refill Approved    Rx renewed per Medication Renewal Policy. Medication was last renewed on 1/9/2018 with 3 refills.   Last office visit: 9/24/2018 with PCP Dr DARIEL Tovar, [facility] Triage/Med Refill 1/27/2019     Requested Prescriptions   Pending Prescriptions Disp Refills     citalopram (CELEXA) 20 MG tablet [Pharmacy Med Name: CITALOPRAM 20MG TABLETS] 90 tablet 0     Sig: Take 1 tablet (20 mg total) by mouth daily.    [facility] Refill Protocol  Passed - 1/25/2019 10:19 AM       Passed - PCP or prescribing provider visit in last year    Last office visit with prescriber/PCP: 9/24/2018 Miryam Corbin MD OR same dept: 9/24/2018 Miryam Corbin MD OR same specialty: 9/24/2018 Miryam Corbin MD  Last physical: 1/17/2018 Last MTM visit: Visit date not found   Next visit within 3 mo: Visit date not found  Next physical within 3 mo: Visit date not found  Prescriber OR PCP: Miryam Corbin MD  Last diagnosis associated with med order: 1. Anxiety  - citalopram (CELEXA) 20 MG tablet [Pharmacy Med Name: CITALOPRAM 20MG TABLETS]; TAKE 1 TABLET(20 MG) BY MOUTH DAILY  Dispense: 90 tablet; Refill: 0    If protocol passes may refill for 12 months if within 3 months of last provider visit (or a total of 15 months).

## 2021-06-23 NOTE — PROGRESS NOTES
[facility] Daily Progress     Patient Name: Renee Sebastian  Date: 2019  Visit #:   PTA visit #:  0  Referral Diagnosis: Trochanteric bursitis, right hip  Referring provider: Miryam Corbin *  Visit Diagnosis:     ICD-10-CM    1. Right hip pain M25.551    2. Weakness of right hip R29.898          Assessment:     Patient demonstrates understanding/independence with home program.  Response to Intervention good  Patient is benefitting from skilled physical therapy and is making steady progress toward functional goals.  Patient is appropriate to continue with skilled physical therapy intervention, as indicated by initial plan of care.    Goal Status:  Pt. will demonstrate/verbalize independence in self-management of condition in : 6 weeks;Progressing toward  Pt. will be independent with home exercise program in : 6 weeks;Progressing toward  Pt. will be able to walk : for exercise/recreation;with no pain;in 12 weeks;Comment;Met  Comment:: without limits  Patient will return to: sport;exercise;in 12 weeks;Partially met  Comment: running    status: she doesn't anticipate she'll be running this winter.     No Data Recorded    Plan / Patient Education:     Continue with initial plan of care.  Progress with home program as tolerated.   1 visit remaining.     Subjective:     Pain Ratin  Hip is feeling good. Not having any pain. Some post treatment soreness after last session. Hip feels looser than in the past.     Objective:     Mod multidirectional capsular restrictions of (R) hip joint.     Treatment Today     TREATMENT MINUTES COMMENTS   Evaluation     Self-care/ Home management     Manual therapy 25 Mobilization - (R) hip distraction with belt 90/90 positioning, neutral/ER gr.II-III, ant glide gr. II-III in prone crawl, post glides in 90/90 Gr. II-III,supine long axis distraction Gr. II-III   Neuromuscular Re-education     Therapeutic Activity     Therapeutic Exercises     Gait training      Modality__________________                Total 25    Blank areas are intentional and mean the treatment did not include these items.       Kaye Giraldo  1/21/2019

## 2021-06-23 NOTE — PROGRESS NOTES
[facility] Daily Progress     Patient Name: Renee Sebastian  Date: 2019  Visit #: 10  PTA visit #:  0  Referral Diagnosis: Trochanteric bursitis, right hip  Referring provider: Miryam Corbin *  Visit Diagnosis:     ICD-10-CM    1. Right hip pain M25.551    2. Weakness of right hip R29.898          Assessment:     Patient demonstrates understanding/independence with home program.  Response to Intervention good  Patient is benefitting from skilled physical therapy and is making steady progress toward functional goals.  Patient is appropriate to continue with skilled physical therapy intervention, as indicated by initial plan of care.    Goal Status:  Pt. will demonstrate/verbalize independence in self-management of condition in : 6 weeks;Progressing toward  Pt. will be independent with home exercise program in : 6 weeks;Progressing toward  Pt. will be able to walk : for exercise/recreation;with no pain;in 12 weeks;Comment;Met  Comment:: without limits  Patient will return to: sport;exercise;in 12 weeks;Partially met  Comment: running    status: she doesn't anticipate she'll be running this winter.     No Data Recorded    Plan / Patient Education:     Continue with initial plan of care.  Progress with home program as tolerated.    Subjective:     Pain Ratin  Feeling really good today. Some post treatment soreness the day after the last session. More difficult to reproduce symptoms and less pinching with home stretches.       Objective:     Mod multidirectional capsular restrictions of (R) hip joint.     Treatment Today     TREATMENT MINUTES COMMENTS   Evaluation     Self-care/ Home management     Manual therapy 25 Mobilization - (R) hip distraction with belt 90/90 positioning, neutral/ER gr.II-III, ant glide gr. II-III in prone crawl, post glides in 90/90 Gr. II-III,supine long axis distraction Gr. II-III   Neuromuscular Re-education     Therapeutic Activity     Therapeutic Exercises      Gait training     Modality__________________                Total 25    Blank areas are intentional and mean the treatment did not include these items.       Kaye Giraldo  1/11/2019

## 2021-06-23 NOTE — PROGRESS NOTES
[facility] Daily Progress / Discharge Summary    Patient Name: Renee Sebastian  Date: 2019  Visit #:   PTA visit #:  0  Referral Diagnosis: Trochanteric bursitis, right hip  Referring provider: Miryam Corbin *  Visit Diagnosis:     ICD-10-CM    1. Right hip pain M25.551    2. Weakness of right hip R29.898          Assessment:   HEP/POC compliance is  good .  Response to Intervention good    Goal Status:  Pt. will demonstrate/verbalize independence in self-management of condition in : 6 weeks;Met  Pt. will be independent with home exercise program in : 6 weeks;Met  Pt. will be able to walk : for exercise/recreation;with no pain;in 12 weeks;Comment;Met  Comment:: without limits  Patient will return to: sport;exercise;in 12 weeks;Partially met  Comment: running    status: she doesn't anticipate she'll be running this winter.     No Data Recorded    Plan / Patient Education:     Initial plan of care has been completed. Patient has responded appropriately to skilled PT intervention.  The patient met goals and has demonstrated understanding of/independence in the home program for self-care and progression to next steps.  The patient reports feeling better and did not wish to schedule further therapy at this time.  No further therapy is required at this time.    Subjective:     Pain Ratin  Doing well. Things are feeling pretty good. A little sore from stretching after the last session. No longer having pain with rising from sitting. No pain with stairs, prolonged standing or walking.     Patient Outcome Measures  Lower Extremity Functional Scale (_/80): 76     Scores range from 0-80, where a score of 80 represents maximum function. The minimal clinically important difference is a positive change of 9 points.      Objective:     Hip/Knee Strength  Date:      Hip/Knee Strength (/5) MMT MMT MMT    Right Left Right Left Right Left   Hip Flexion         Hip Abduction 5        Hip Adduction          Hip Extension 5        Hip External Rotation         Hip Internal Rotation         Knee Extension         Knee Flexion             Treatment Today  TREATMENT MINUTES COMMENTS   Evaluation     Self-care/ Home management     Manual therapy 15 Mobilization - (R) hip distraction with belt 90/90 positioning, neutral/ER gr.II-III, ant glide gr. II-III in prone crawl, post glides in 90/90 Gr. II-III, supine long axis distraction Gr. II-III   Neuromuscular Re-education     Therapeutic Activity     Therapeutic Exercises 10 Reassessed goals, functional status, objective measures, outcome measures.    Gait training     Modality__________________                Total 25    Blank areas are intentional and mean the treatment did not include these items.     Kaye Giraldo  1/29/2019

## 2021-06-24 NOTE — PROGRESS NOTES
FEMALE ADULT PREVENTIVE EXAM    CHIEF COMPLAINT:  Female preventive exam.    SUBJECTIVE:  Renee Sebastian is a 38 y.o. female who presents for her routine physical exam.    Patient would like to address the following concerns today: Ear pain for the past 2 days, initially on the left, but the right seems to be involved lately, throbbing pain like water in the ears with muffle sensation.  No recent history of ear infection.  Depression anxiety, not adequately controlled.  Interested in increasing the medication dose.  Triggering factors of depression or frequent illness, weather changes, stressful work.      GYNE HISTORY  Menses: yes  Sexually Active: yes  Contraception: nuvaring  Last Pap: 2018  Abnormal Pap: no  Vaginal Discharge: no      She  has no past medical history on file.    Lab Results   Component Value Date    WBC 5.7 11/18/2016    HGB 14.3 11/18/2016    HCT 41.4 11/18/2016    MCV 86 11/18/2016     11/18/2016     01/17/2018    K 4.3 01/17/2018    BUN 13 01/17/2018     Lab Results   Component Value Date    CHOL 179 01/17/2018    HDL 60 01/17/2018    LDLCALC 96 01/17/2018    TRIG 116 01/17/2018     Lab Results   Component Value Date    TSH 1.64 01/17/2018     BP Readings from Last 3 Encounters:   02/26/19 140/79   09/24/18 138/88   01/17/18 122/78       Surgeries:  No past surgical history on file.    Family History:  Her family history includes Cancer in her father; Hearing loss in her father; Heart disease in her father; Hypertension in her father.    Social History:  She  reports that  has never smoked. she has never used smokeless tobacco.    Medications:    Current Outpatient Medications:      ADAPALENE (DIFFERIN TOP), Apply topically., Disp: , Rfl:      ciclopirox (PENLAC) 8 % solution, 1 APPLICATION TOPICALLY D UTD, Disp: , Rfl: 5     citalopram (CELEXA) 20 MG tablet, Take 1 tablet (20 mg total) by mouth daily., Disp: 90 tablet, Rfl: 3     etonogestrel-ethinyl estradiol (NUVARING)  0.12-0.015 mg/24 hr vaginal ring, INSERT 1 RING VAGINALLY AND LEAVE IN PLACE FOR 3 WEEKS THEN REMOVE FOR 1 WEEK, Disp: 6 each, Rfl: 8     spironolactone (ALDACTONE) 50 MG tablet, , Disp: , Rfl:      amoxicillin (AMOXIL) 875 MG tablet, Take 1 tablet (875 mg total) by mouth 2 (two) times a day for 10 days., Disp: 20 tablet, Rfl: 0     citalopram (CELEXA) 10 MG tablet, Take 1 tablet (10 mg total) by mouth daily., Disp: 90 tablet, Rfl: 3  HELD MEDICATIONS: None.    Allergies:  No latex allergies.  No Known Allergies         RISK BEHAVIOR & HEALTH HABITS  Self Breast Exam: yes.  Regular Exercise: yes.  Balanced diet: yes.  Seat Belt Use: yes  Calcium intake/Osteoporosis prevention: yes.    Guns: NA  Sun Screen: YES  Dental Care: YES    REVIEW OF SYSTEMS:  Complete head to toe review of systems is otherwise negative except as above.    OBJECTIVE:  VITAL SIGNS:    Vitals:    02/26/19 1617   BP: 140/79   Pulse: 84   SpO2: 98%     GENERAL:  Patient alert, in no acute distress.  EYES: PERRLA. Extraoccular movements intact, pupils equal, reactive to light and accommodation.  Normal conjunctiva and lids.    ENT:  Hearing grossly normal.  Normal appearance to ears and nose.  Moderate erythema right greater than left TM, oropharynx normal. Normal lips, gums and teeth.    NECK:  Supple, without thyromegaly or mass, no adenopathies.  RESP:  Clear to auscultation without crackles, wheezes or distress.  Normal respiratory effort.   CV:  Regular rate and rhythm without murmurs, rubs or gallops.  Normal pedal pulses.  No varicosities or edema.  ABDOMEN:  Soft, non-tender, without hepatosplenomegaly, masses, or hernias.   BREASTS:  deferred   :  Rectal: deferred   LYMPHATIC: Normal palpation of neck.  No lymphadenopathy.  No bruising.  NEURO:  CN II-XII intact, motor & sensory function all intact.  DTR and reflexes normal.  PSYCHIATRIC:  Alert & oriented with normal mood and affect.  Good judgment and insight.  SKIN:  Normal  inspection and palpation.  MUSCULOSKELETAL: Normal gait and station.  - Spine / Ribs / Pelvis: Normal inspection, ROM, stability and strength: Spine, Head, Neck, Upper and Lower Extremities.    ASSESSMENT & PLAN  Renee was seen today for annual exam, ear fullness and injections.    Diagnoses and all orders for this visit:    Bilateral non-suppurative otitis media  -     amoxicillin (AMOXIL) 875 MG tablet; Take 1 tablet (875 mg total) by mouth 2 (two) times a day for 10 days.    Anxiety  -     Discontinue: citalopram (CELEXA) 40 MG tablet; Take 0.5 tablets (20 mg total) by mouth daily.  -     citalopram (CELEXA) 10 MG tablet; Take 1 tablet (10 mg total) by mouth daily.  -     citalopram (CELEXA) 20 MG tablet; Take 1 tablet (20 mg total) by mouth daily.    Contraception management  -     etonogestrel-ethinyl estradiol (NUVARING) 0.12-0.015 mg/24 hr vaginal ring; INSERT 1 RING VAGINALLY AND LEAVE IN PLACE FOR 3 WEEKS THEN REMOVE FOR 1 WEEK    Other orders  -     Tdap vaccine,  8yo or older,  IM    Mild acute otitis media, amoxicillin was prescribed to take as directed, possible side effect discussed return if not improving.  Depression not adequately controlled with Celexa 20 mg daily, will increase to 30 mg daily and follow-up in about 6-8 weeks.  General  Immunizations reviewed and updated .  Alcohol use, safety and moderation discussed.  Recommended adequate calcium, vitamin D intake/osteoporosis prevention.  Discussed colon cancer screening at age 50, 45 if -American.  Diet and exercise reviewed, including goal of aerobic exercise 30-90 minutes most days of the week, moderation of portions sizes, avoiding eating out and fast food and increase in fruits and vegetables.  Discussed safe sex practices.    Discussed & recommended seat belt (& motorcycle helmet) use.  Discussed & recommended smoke detector.  Discussed sun protection.  Discussed weight management.  Discussed self breast exam, screening mammogram  timing.  I have had an Advance Directives discussion with the patient.  The following are part of a depression follow up plan for the patient:  coping support assessment, coping support management, emotional support assessment and emotional support education  The following high BMI interventions were performed this visit: encouragement to exercise and weight loss from baseline weight    Miryam Corbin MD

## 2021-07-04 NOTE — PATIENT INSTRUCTIONS - HE
Patient Instructions by Miryam Corbin MD at 5/26/2021  2:40 PM     Author: Miryam Corbin MD Service: -- Author Type: Physician    Filed: 5/26/2021  3:03 PM Encounter Date: 5/26/2021 Status: Signed    : Miryam Corbin MD (Physician)         Patient Education     Understanding Anxiety Disorders  Almost everyone gets nervous now and then. Its normal to have knots in your stomach before a test. Or for your heart to beat fast on a first date. But an anxiety disorder is much more than a case of nerves. In fact, its symptoms may be overwhelming. But treatment can ease many of these symptoms. Talking with your healthcare provider is the first step.    What are anxiety disorders?  An anxiety disorder causes very strong feelings of panic and fear. These feelings may occur for no clear reason. And they tend to happen again and again. They may prevent you from coping with life. They may cause you great distress. You may then stay away from anything that triggers your fear. In extreme cases, you may never leave the house. Anxiety disorders may cause other symptoms, such as:    Obsessive thoughts that are unwanted and you cant control    Constant nightmares or painful thoughts of the past    Upset stomach, sweating, and muscle tension    Trouble sleeping or focusing  What causes anxiety disorders?  Anxiety disorders tend to run in families. For some people, childhood abuse or neglect may play a role. For others, stressful life events or trauma may trigger these disorders. Anxiety can trigger low self-esteem and poor coping skills.  Types of anxiety disorders    Panic disorder. This causes a very strong fear of being in danger.    Phobias. These are extreme fears of certain things, places, or events.    Obsessive-compulsive disorder (OCD). This makes you have unwanted thoughts and urges. You also may do certain things over and over.    Posttraumatic stress disorder (PTSD). This occurs in  people who have been through a terrible ordeal. It can cause nightmares and flashbacks about the event.    Generalized anxiety disorder. This causes constant worry. It can greatly disrupt your life.    Getting better  You may believe that nothing can help you. Or, you might fear what others may think. But most anxiety symptoms can be eased. Having an anxiety disorder is nothing to be ashamed of. Most people do best with treatment that combines medicine and individual and group therapy. These arent cures. But they can help you live a healthier life.  Date Last Reviewed: 4/1/2019 2000-2019 [employer]. [address] [place] [address], [place] [address]. All rights reserved. This information is not intended as a substitute for professional medical care. Always follow your healthcare professional's instructions.

## 2021-08-16 ENCOUNTER — MYC MEDICAL ADVICE (OUTPATIENT)
Dept: FAMILY MEDICINE | Facility: CLINIC | Age: 41
End: 2021-08-16

## 2021-08-16 DIAGNOSIS — F41.1 ANXIETY STATE: ICD-10-CM

## 2021-08-18 RX ORDER — BUSPIRONE HYDROCHLORIDE 10 MG/1
10 TABLET ORAL 3 TIMES DAILY
Qty: 90 TABLET | Refills: 1 | Status: SHIPPED | OUTPATIENT
Start: 2021-08-18 | End: 2021-11-08

## 2021-09-19 ENCOUNTER — HEALTH MAINTENANCE LETTER (OUTPATIENT)
Age: 41
End: 2021-09-19

## 2021-10-19 ENCOUNTER — MYC MEDICAL ADVICE (OUTPATIENT)
Dept: FAMILY MEDICINE | Facility: CLINIC | Age: 41
End: 2021-10-19

## 2021-10-19 DIAGNOSIS — F41.9 ANXIETY: ICD-10-CM

## 2021-10-19 RX ORDER — CITALOPRAM HYDROBROMIDE 20 MG/1
TABLET ORAL
Qty: 135 TABLET | Refills: 1 | Status: SHIPPED | OUTPATIENT
Start: 2021-10-19

## 2021-11-05 DIAGNOSIS — F41.1 ANXIETY STATE: ICD-10-CM

## 2021-11-08 RX ORDER — BUSPIRONE HYDROCHLORIDE 10 MG/1
TABLET ORAL
Qty: 270 TABLET | Refills: 1 | Status: SHIPPED | OUTPATIENT
Start: 2021-11-08

## 2021-11-08 NOTE — TELEPHONE ENCOUNTER
"Last Written Prescription Date:  8/18/21  Last Fill Quantity: 90,  # refills: 1   Last office visit provider:  5/26/21    Requested Prescriptions   Pending Prescriptions Disp Refills     busPIRone (BUSPAR) 10 MG tablet [Pharmacy Med Name: BUSPIRONE 10MG TABLETS] 90 tablet 1     Sig: TAKE 1 TABLET(10 MG) BY MOUTH THREE TIMES DAILY       Atypical Antidepressants Protocol Passed - 11/5/2021 12:07 PM        Passed - Recent (12 mo) or future (30 days) visit within the authorizing provider's specialty     Patient has had an office visit with the authorizing provider or a provider within the authorizing providers department within the previous 12 mos or has a future within next 30 days. See \"Patient Info\" tab in inbasket, or \"Choose Columns\" in Meds & Orders section of the refill encounter.              Passed - Medication active on med list        Passed - Patient is age 18 or older        Passed - No active pregnancy on record        Passed - No positive pregnancy test in past 12 mos             Kareen Andrew RN 11/08/21 2:34 PM  "

## 2022-06-26 ENCOUNTER — HEALTH MAINTENANCE LETTER (OUTPATIENT)
Age: 42
End: 2022-06-26

## 2022-11-21 ENCOUNTER — HEALTH MAINTENANCE LETTER (OUTPATIENT)
Age: 42
End: 2022-11-21

## 2023-03-29 ENCOUNTER — TRANSFERRED RECORDS (OUTPATIENT)
Dept: HEALTH INFORMATION MANAGEMENT | Facility: CLINIC | Age: 43
End: 2023-03-29

## 2023-07-09 ENCOUNTER — HEALTH MAINTENANCE LETTER (OUTPATIENT)
Age: 43
End: 2023-07-09

## 2024-02-04 ENCOUNTER — HEALTH MAINTENANCE LETTER (OUTPATIENT)
Age: 44
End: 2024-02-04

## 2024-05-28 ENCOUNTER — TRANSFERRED RECORDS (OUTPATIENT)
Dept: HEALTH INFORMATION MANAGEMENT | Facility: CLINIC | Age: 44
End: 2024-05-28
Payer: COMMERCIAL